# Patient Record
Sex: FEMALE | Race: WHITE
[De-identification: names, ages, dates, MRNs, and addresses within clinical notes are randomized per-mention and may not be internally consistent; named-entity substitution may affect disease eponyms.]

---

## 2018-09-21 ENCOUNTER — HOSPITAL ENCOUNTER (OUTPATIENT)
Dept: HOSPITAL 46 - ED | Age: 78
Setting detail: OBSERVATION
LOS: 3 days | Discharge: HOME | End: 2018-09-24
Attending: SPECIALIST | Admitting: SPECIALIST
Payer: MEDICARE

## 2018-09-21 VITALS — HEIGHT: 65 IN | WEIGHT: 192.99 LBS | BODY MASS INDEX: 32.15 KG/M2

## 2018-09-21 DIAGNOSIS — E78.00: ICD-10-CM

## 2018-09-21 DIAGNOSIS — M81.0: ICD-10-CM

## 2018-09-21 DIAGNOSIS — M17.11: Primary | ICD-10-CM

## 2018-09-21 DIAGNOSIS — K52.9: ICD-10-CM

## 2018-09-21 DIAGNOSIS — M54.9: ICD-10-CM

## 2018-09-21 DIAGNOSIS — I10: ICD-10-CM

## 2018-09-21 DIAGNOSIS — Z79.899: ICD-10-CM

## 2018-09-21 DIAGNOSIS — K21.9: ICD-10-CM

## 2018-09-21 DIAGNOSIS — F17.200: ICD-10-CM

## 2018-09-21 DIAGNOSIS — M25.861: ICD-10-CM

## 2018-09-21 DIAGNOSIS — G89.29: ICD-10-CM

## 2018-09-21 DIAGNOSIS — R26.2: ICD-10-CM

## 2018-09-21 PROCEDURE — G8979 MOBILITY GOAL STATUS: HCPCS

## 2018-09-21 PROCEDURE — G8978 MOBILITY CURRENT STATUS: HCPCS

## 2018-09-21 PROCEDURE — 0S9C3ZX DRAINAGE OF RIGHT KNEE JOINT, PERCUTANEOUS APPROACH, DIAGNOSTIC: ICD-10-PCS | Performed by: EMERGENCY MEDICINE

## 2018-09-21 PROCEDURE — G0378 HOSPITAL OBSERVATION PER HR: HCPCS

## 2018-09-21 NOTE — XMS
PreManage Notification: DARVIN QUIJANO MRN:[id number]
 
Security Information
 
Security Events
No recent Security Events currently on file
 
 
 
CRITERIA MET
------------
- POL
 
 
CARE PROVIDERS
There are no care providers on record at this time.
 
[facility] has no Care Guidelines for this patient.
 
CECILIA VISIT COUNT (12 MO.)
-------------------------------------------------------------------------------------
2 [facility]
-------------------------------------------------------------------------------------
TOTAL 2
-------------------------------------------------------------------------------------
NOTE: Visits indicate total known visits.
 
ED/UCC VISIT TRACKING (12 MO.)
-------------------------------------------------------------------------------------
09/21/2018 20:04
[facility]
 
TYPE: Emergency
 
COMPLAINT:
- R LEG PAIN/NO INJURY
-------------------------------------------------------------------------------------
03/15/2018 18:02
[facility]
 
TYPE: Emergency
 
COMPLAINT:
- FAINTING/ILL
 
DIAGNOSES:
- Other long term (current) drug therapy
- Nicotine dependence, unspecified, uncomplicated
- Influenza due to other identified influenza virus with other respiratory
manifestations
- Essential (primary) hypertension
- Syncope and collapse
- Dehydration
-------------------------------------------------------------------------------------
 
 
INPATIENT VISIT TRACKING (12 MO.)
No inpatient visits to display in this time frame
 
 
[email or web address]

## 2018-09-22 NOTE — NUR
PT UP TO BEDSIDE COMMODE FOR SOME GAS. PT ABLE TO PIVOT TO BEDSIDE COMMODE.
FAMILY IN ROOM. PT STATES HER KNEE IS FEELING MODERATELY BETTER.

## 2018-09-22 NOTE — NUR
0.5MG DILAUDED WAS GIVEN FOR PAIN 8/10. RIGHT PEDIS PULSE IS +1 BUT TOES ARE
WARM TO TOUCH. RIGHT KNEE HAS MINIMAL EDEMA PRESENT, NO REDNESS OR HEAT NOTED.
ALL LOBES ARE CLEAR, ABD SOUNDS ARE PRESENT. PT IS DUE TO VOID BY 0600. NO NEW
CONCERNS NOTED AT THIS TIME.

## 2018-09-22 NOTE — NUR
PT ARRIVED TO UNIT VIA STRETCHER. PT PULLED TO BED. TOLERATED WELL. PT C/O
SPASMS TO R KNEE WHILE WRTIER PERFORMING ADMISSION ASSESSMENT. PT REQUESTS
PRN. PT DENIES OTHER NEEDS AT THIS TIME. CALL LIGHT WITHIN REACH.

## 2018-09-22 NOTE — NUR
PT HAD A GOOD NAP THIS AFTERNOON AND STATES THAT HER KNEE IS FEELING SLIGHTLY
BETTER. WAS ABLE TO PIVOT TRANSFER TO THE BEDSIDE COMMODE FOR SOME GAS.
GABAPENTIN SEEMS TO BE HELPING AS SHE HAS NOT HAD ANY PAIN MEDICATION. ARVIN ALEJO DRAINING LIGHT YELLOW URINE. ENCOURAGE ORAL INTAKE. 1 LITER BOLUS GIVEN.

## 2018-09-22 NOTE — NUR
RECIEVED REPORT FROM LEONOR ACKERMAN. DR TEAGUE IN ROOM WITH PT. DISCUSSED PLAN OF
CARE, GETTING OUT OF BED, IMAGING, AND SAMANIEGO.  RN WILL PLACE SAMANIEGO WHEN PT
RETURNS TO THE FLOOR FROM IMAGING.

## 2018-09-22 NOTE — NUR
PT OFF FLOOR TO CT/XRAY.  ORDER FOR 2 VIEW CHEST X-RAY AND CT OF R KNEE.
SAMANIEGO WILL BE PLACED ONCE SHE RETURNS.  PT ABLE TO STAND PIVOT TO WHEELCHAIR.

## 2018-09-22 NOTE — NUR
PT ARRIVED ON FLOOR AT 0150. SBP WAS ELEVATED 162. ALL LOBES ARE CLEAR, ABD
SOUNDS PRESENT. RIGHT KNEE HAS MINIMAL EDMEA, PEDIS PULSES +2, NO REDNESS AND
NO HEAT NOTED. PAIN IS WELL CONTROLLED WITH PRN PAIN MEDS. PT AT TIMES HAS LEG
SPASMS AS WELL. PT SO FAR STILL HAS NOT VOIDED. WILL PUT PT ON BED PAN SINCE
SHE IS NOT ABLE TO PUT WEIGHT ON HER RIGHT LEG.

## 2018-09-22 NOTE — NUR
PT RESTING IN BED, WAITING FOR HER  AND DAUGHTER TO RETURN.  SHE HAS
LUNCH AT BEDSIDE, SHE WILL EAT WHEN THEY RETURN.  BOLUS OF FLUIDS IS RUNNING.
NO COMPLAINTS OF PAIN, NO NAUSEA, NO VOMITING.  SAMANIEGO DRAINING YELLOW URINE.

## 2018-09-22 NOTE — NUR
V/S ARE WDL, ALL LOBES ARE CLEAR, ABD SOUNDS ARE PRESENT. RIGHT KNEE HAS
SLIGHT EDEMA PRESENT BUT NO REDNESS OR HEAT WAS NOTED. SLIGHT EDEMA WAS ALSO
NOTED TO RIGHT ANKLE. PT DENIES PAIN OR MUSCLE SPASMS AT THIS TIME. NO NEW
CONCERNS NOTED SO FAR.

## 2018-09-22 NOTE — NUR
REPORT RECEIVED FROM LEONOR MANRIQUE. PT IN BED WITH FAMILY IN ROOM. TALKATIVE AND
PLEASANT. PT DENIES CONCERNS. STATES HER KNEE IS SWOLLEN AND IS STILL PAINFUL
BUT ACCEPTABLE.

## 2018-09-22 NOTE — NUR
RECEIVED REPORT AT 1900. FOUND PT IN BED AWAKE WITH  AT BEDSIDE. PT HAD
NO NEEDS OR CONCERNS AT THIS TIME.

## 2018-09-23 NOTE — NUR
RECIEVED BEDSIDE REPORT FROM LEONOR SUNG. PT AWAKE AND ALERT IN BED, SMILING
AND TALKING.  PT REPORTS FEELING "GOOD", REQUESTS 3RD CUP OF COFFEE.  PT
REPORTS NO PAIN IN RIGHT KNEE, NO REDNESS, NO WARMTH.  SAMANIEGO DRAINING COPIOUS
AMOUNTS OF URINE.

## 2018-09-23 NOTE — NUR
V/S ARE WDL, ALL LOBES CLEAR. RIGHT KNEE HAS LOOKED THE SAME SINCE ADMISSION.
PAIN IS 6/10. PRN 5MG OXY GIVEN. NO CHANGES AT ALL NOTED SO FAR.

## 2018-09-23 NOTE — NUR
PT HAD AN UNEVENTFUL NIGHT. RIGHT KNEE HAS HAD NO CHANGES IN SYMPTOMS AND
APPEARANCE SINCE HER ADMISSION. PT DID NOT NEED ANY PRN
PAIN MEDICATION FORM ME SO
FAR. V/S ARE WDL SO FAR. URINE OUTPUT IS ADEQUATE. NO NEW CONCERNS SO FAR.

## 2018-09-23 NOTE — NUR
PT REMAINS AFEBRILE WITH NO REDNESS, MINIMAL SWELLING, AND NO WARMTH ON RIGHT
KNEE. PT REPORTS PAIN WITH MOVEMENT, BUT PAIN IS TOLERABLE.  PT INITIATED
AMBULATION IN ROOM WITH FWW AND RN.  ONE PERSON ASSIST.  PT TOLERATING DIET
WELL.  URINE OUTPUT QS.  VSS.  PT EVAL COMPLETE. PT AMBULATED WITH PHYSICAL
THERAPY.

## 2018-09-23 NOTE — NUR
PT IN BED WITH FAMILY AT BEDSIDE. STATES SHE HAS NO PAIN IN HER KNEE UNLESS
SHE MOVES IT.  TOLERATED PHYSICAL THERAPY WELL.  NO NAUSEA, NO DISCOMFORT, PT
STATES SHE "FEELS PRETTY GOOD TODAY."

## 2018-09-23 NOTE — NUR
DISCUSSED TIMING OF CARE WITH KAYLYNN, PHYSICAL THERAPY.  SHE WILL BE IN TO DO
PT EVAL ABOUT 1000.  WE DISCUSSED OFFERING PAIN MEDICATION PRIOR TO EVAL. RN
ASKED PT IF SHE WOULD LIKE PAIN MEDICATIONS PRIOR TO PHYSICAL THERAPY. PT
DECLINED PAIN MEDS, STATED "I'LL BE STRONG".  RN EDUCATED PT ON TAKING
APPROPRIATE PAIN MEDICATIONS AND HEALING.  PT STILL DECLINED PAIN MEDS.  RN
ADVISED TO CALL IF SHE CHANGES HER MIND OR NEEDS PAIN MEDS AFTER PHYSICAL
THERAPY.

## 2018-09-23 NOTE — NUR
VITALS AND I&OS DONE AND CHARTED, FRESH ICE WATER GIVEN. BEDSIDE TABLE AND
CALL LIGHT IN REACH. PT NEEDS NOTHING AT THIS TIME.

## 2018-09-23 NOTE — NUR
PT CALLED RN TO ROOM TO ASSIST HER WALKING TO THE BATHROOM.  PT ATTEMPTED TO
HAVE A BM, NO RESULTS.  PT WALKED WITH RN AND FWW TO BATHROOM.  TOLERATED
WELL.  PT REPORTED PAIN WITH AMBULATION, BUT PAIN WAS TOLERABLE.  DENIED NEED
FOR PAIN MEDICATIONS.  DAUGHTER AT BEDSIDE.

## 2018-09-23 NOTE — NUR
RECEIVED REPORT AT 1900, FOUND PT IN BED WATCHING TV. PT DENIED PAIN AND HAD
NO OTHER CONCERNS AT THAT TIME.

## 2018-09-24 NOTE — NUR
PT UP WALKING IN HALLS WITH PHYSICAL THERAPY.  PT APPEARS TO BE TOLERATING
WELL, LAUGHING AND SMILING WITH STAFF.  RN ANSWERED QUESTIONS RE: GOUT POSED
BY THERAPY AND PT.

## 2018-09-24 NOTE — NUR
PT AT FIRST HAD A VERY UNEVENTFUL NIGHT. PRN OXY WAS GIVEN BEFORE BEDTIME DUE
TO PAIN. RIGHT KNEE HAS REMAIND UNCHANGED SINCE ADMISSION ON 9/22. PT SLEPT
MOST OF THE NIGHT. V/S ARE WDL.
AT ABOUT 0400 PT HAD PAIN 6/10 AND IV TORADOL WAS TO BE GIVEN. IV SITE WAS
LEAKING, PT REFUSED A NEW SITE AND ALSO REFUSED PRN OXY. MD TEAGUE WAS CALLED
AND AN ORDER FOR PO TORADOL WAS GIVEN. PT AT THIS TIME IS SLEEPING.
ONE NEW CONCERN AT THIS TIME IS THAT BASE OF HER LEFT THUMB IS SWOLLEN AND
VERY PAINFUL TO TOUCH. WILL CONTINUE TO MONITOR.

## 2018-09-24 NOTE — NUR
FOUND PT AWAKE IN BED COMPLAINING OF PAIN 6/10. PT STATED THAT PO PRN OXY DID
NOT HELP. WHILE GIVING IV PRN TORADOL IT WAS NOTED THAT HER IV SITE WAS
LEAKING. PT AT THIS TIME REFUSES A NEW IV SITE AND DOES NOT WANT THE PO PRN
OXY EITHER. MD TEAGUE TO BE CALLED FOR ANOTHER PO PRN PAIN MEDICATION.

## 2018-09-24 NOTE — NUR
PATIENT IN BED RESTING WATCHING TV. FRESH WATER GIVEN. CALL LIGHT IN REACH. NO
OTHER NEEDS AT THIS TIME.

## 2018-09-24 NOTE — NUR
PATIENT WAS IN BED, FACE WASHED, PATIENT NOW  LAYING IN BED. VITALS DONE ,
CALL LIGHT IN REACH, 1 PA WITH 4WW
THERPHY NOW WITH PATIENT WALKING IN [place], FRESH WATER
GIVEN. NO OTHER NEEDS AT THIS TIME.

## 2018-09-24 NOTE — NUR
Per patient request, she does not want the RX for oxycodone. Dr Escudero is
aware of this & replaced pain medication with ketorolac. Pharmacist cancelled
the order for oxycodone on discharge orders.

## 2018-09-24 NOTE — NUR
Verified with Dr Escudero that he intended for patient to receive #60 doses (not
capsules). Called [facility], talked to [facility], changed RX to Gabapentin
300mg with sig of: Take 2 capsules PO TID. Disp #120

## 2018-09-24 NOTE — NUR
RECIEVED BEDSIDE REPORT FROM LEONOR SUNG.  PT IS AWAKE AND ALERT IN BED.
PT FEELS SHE DID NOT SLEEP MOST OF THE NIGHT, REPORT FROM NIGHT RN IS THAT SHE
SLEPT WELL UNTIL 0400.  PT FEELS OXY WAS NOT EFFECTIVE FOR PAIN CONTROL, PO
TORADOL WAS EFFECTIVE.  PT WOULD LIKE DR TEAGUE TO EXPLAIN DISEASE PROCESS AND
WHY SHE IS NOT "BETTER" TO PT AND HER .  PT HAS NO IV ACCESS.

## 2020-01-10 NOTE — NUR
PT HAS BEEN UP TO BATHROOM WITH SBA USING FWW, REFUSED TO SIT UP IN CHAIR
TODAY, TEARFUL WHEN PHYSICAL THERAPY WORKED WITH HER, GOOD PAIN CONTROL USING
OXYCODONE AND POSITIONING OF R KNEE, ICE TO KNEE ON AND OFF TODAY, GOOD CMS TO
FOOT, ONEIDA HOSE ON, PICCO DRSG INTACT WITH GREEN LIGHT, ON-Q PATENT AND SECURE.
NO NEW SHADOWING NOTED ON DRSG. PT STATES SHE IS JUST EMOTIONAL AND MAY NEED
TO GO TO NURSING HOME. ENC PATIENT TO CONT. TO WORK WITH REHAB TO GET
STRONGER. POOR APPETITE TODAY. ALTERNATIVES OFFERED BUT MOSTLY REFUSED. USING
CALL LIGHT APPROPROIATELY.

## 2020-01-10 NOTE — NUR
IN TO TALK WITH PT, SHE STATED UNDERSTANDING OF WHY SHE CAME BACK INTO THE
HOSPITAL AND THEN STATES, "I NEED A DAY OFF FROM EVERYTHING WHERE I JUST DON'T
HAVE TO DO ANYTHING THAT ANYONE ASKS.  I JUST WANT TO BE LEFT ALONE."  STATES
SHE REMEMBERS THE INFORMATION THAT WE GAVE HER FROM HER KNEE SURGERY.  PT JUST
WANTS TO REST.

## 2020-01-10 NOTE — NUR
PT ARRIVED TO FLOOR AT 0645. COMPLETED ADMISSION HX WITH PT. SHE IS ORIENTED
TO THE ROOM AND DENIES FURTHER NEEDS AT THIS TIME.  IS AT BEDSIDE AND
CALL LIGHT IS CLOSE.

## 2020-01-10 NOTE — NUR
PT ASSESSMENT, VS AND I&O COMPLETED. PRN PAIN MEDICATION PROVIDED. PT UP TO
BSC WITH 1 PA, FWW AND BACK TO BED. IV CDI, WNL, FLUSHED WELL. ICE WATER
PROVIDED. KAREN DRY, INTACT, FLASHING GREEN. ON-Q AT 8. NO OTHER NEEDS. CALL
LIGHT IN REACH.

## 2020-01-10 NOTE — NUR
PT IN TO EVAL PATIENT, PREMEDICATED FOR PAIN, PATIENT IS EMOTIONAL THIS
AFTERNOON, STATES THERE IS NO SPECIFIC REASON, DENIES ANY NEEDS.

## 2020-01-10 NOTE — NUR
Discussed this patient with Dr. Paniagua.  Concerns pt has arthrities, shoulder
injury, and deconditioning.  Will evaluate patient.
 
In and spoke with patient and she feels she is really not able to go home.  Pt
is tearful.  Explained options of PT with HH or possible admit to a SNF for
further PT for strengthening.  Gave names of SNFs/HH in the area and she would
like to stay in [place].  Patient and  discussed options and feel pt
would do best at a SNF.  Let her know I will send her chart to [facility]. Pt states
she declined PT today as she did not feel up to it.  I spoke with OT and PT
and they both feel pt would do best with placement in a SNF and may need a
longer term placement for strengthening.
they
feel pt wou

## 2020-01-10 NOTE — NUR
VISITED BRIEFLY WITH PT AS SHE WAS BEING WHEELED TO HER RM FROM ER. PT WAS
DISCOURAGED, GAVE BLESSING, WILL FOLLOW AS NEEDED

## 2020-01-10 NOTE — NUR
PT STATES SHE IS COMFORTABLE AT THIS TIME, BREAKFAST ORDERED,  AT
BEDSIDE AND COFFEE OFFERED. COMPLETED ADMISSION ASSESSMENT, PT DENIES NEEDS AT
THIS TIME.

## 2020-01-10 NOTE — NUR
SBA INTO BATHROOM USING FWW TO VOID, MINIMAL ASSIST REQUIRED DENIED DIZZINESS,
ORTHOSTATIC V/S TAKEN. STATES SHE WANTS TO NAP BEFORE WORKING WITH PT. CALL
LIGHT IN EASY REACH.

## 2020-01-10 NOTE — NUR
Spoke with Ai, states she fell in the bathroom while using her walker.
States she did not want to return to the hospital, family called the
ambulance.  States she hit the back of her head.  Has ramp, walker, commode at
home.  Family will assist her. Would like to return to home on discharge.

## 2020-01-10 NOTE — NUR
PATIENT ARRIVED HERE ON MED/SURG AT 0645. PATIENT'S RIGHT KNEE IS STILL
BANDAGED FROM HER SURGERY LAST MONDAY. PER REPORT FROM LEONOR LUIS IN THE ER.
PATIENT HAD A SYNCOPAL EPISODE AT HOME AND HIT HER HEAD ON THE VANITY AS SHE
WENT DOWN.  CALLED AN AMBULANCE AND SHE WAS BROUGHT TO THE ER.
PATIENT'S ACEWRAP STILL REMAINS IN PLACE FROM WHEN SHE DISCHARGED FROM HERE
YESTERDAY. PATIENT STILL ON-Q AND KAREN IN PLACE. KAREN DRESSING COVERED IN
DRIED BLOOD ON THE OUTER RIGHT HALF. PATIENT HAS A HX OF RT TOTAL KNEE ON
MONDAY. PATIENT'S PAIN IS 7/10 AND HER LAST PAIN MEDS WERE TAKEN AT
HOME AT 1930. WAS TOLD IN REPORT PATIENT TAKES ASA 325MG BID. HEAD CT AND EKG
NEGATIVE PER REPORT. 500ML BOLUS OF FLUID AND ROCEPHIN GIVEN IN ER.

## 2020-01-10 NOTE — NUR
SHIFT REPORT RECIEVED FROM PRAVEENA GALVEZ. KAREN DRY AND INTACT. ON-Q @ 8, KAREN
BLINKING GREEN. MILD SWEELING TO INNER RIGHT KNEE. PAIN 7/10, ICE PACK
PROVIDED. NO OTHER NEEDS, CALL LIGHT IN REACH.

## 2020-01-11 NOTE — NUR
ASSESSMENT COMPLETED. SCHEDULED MEDS PROVIDED. PRN PAIN MED PROVIDED FOR 6/10
RIGHT KNEE PAIN. DRESSING DRY, INTACT. IV CDI, WNL, FLUSHED WELL. BLE 1+
EDEMA. SCATTERED BRUISING ON ARMS. BRUISE NOTED TO UPPER RIGHT BACK. EDEMA ON
INNER RIGHT KNEE, GENERALIZED. NO OTHER NEEDS AT THIS TIME, CALL LIGHT IN
REACH.

## 2020-01-11 NOTE — NUR
DR TEAGUE IN TO SEE PT, REMOVES DRESSING TO KNEE APPLIES A ZIP CLOSURE DEVICE
INSTRUCTS STAFF TO APPLY ALLEVYN OVER THIS. PT IS RESTING IN BED CRYO IN
PLACE. AGREES SHE IS COMFORTABLE.

## 2020-01-11 NOTE — NUR
PT CALLS TO USE BR AND BACK TO BED. PAIN 6/10 IN RIGHT KNEE. PRN PAIN MED
PROVIDED. ORTHOSTATIC VS COMPLETED. ICE PACK AND ICE WATER PROVIDED. NO OTHER
NEEDS. CALL LIGHT IN REACH.

## 2020-01-11 NOTE — NUR
SHIFT REPORT RECIEVED FROM MARCOS GALVEZ. R KNEE SWELLING TO INNER KNEE,
GENERALIZED. DRESSING DRY AND INTACT, SMALL AREA OF DRIED BLOOD. IV CDI. PAIN
5/10, PT DENIES NEED FOR PAIN INTERVENTION. CRYO CUFF, ONEIDA HOSE IN PLACE. TELE
#1 SHOWS SR @ 70. NO NEEDS AT THIS TIME. CALL LIGHT IN REACH.

## 2020-01-11 NOTE — NUR
PT HAS SLEPT MOST THE SHIFT. PAIN IN RIGHT KNEE MANAGED WITH PRN PAIN
MEDICATION. GENERALIZED SWELLING NOTED TO INNER LEFT KNEE. EDEMA TO BLE,
1+, ONEIDA HOSE ON. ICE PACKS PROVIDED FOR KNEE. KAREN HAS DRIED BLOOD OVER MOST
THE DRESSING, BLINKING GREEN. ON-Q SET AT 8. ORTHOSTATIC VITALS TAKEN,
NEGATIVE. PT AMBULATES WELL WITH 1PA, FWW. PT HAS A LOWER BP IN THE LEFT
ARM THAN THE RIGHT ARM. TELE # 1 SR TRENDING IN THE 60S. IV CDI, WNL, FLUSHED
WELL.

## 2020-01-11 NOTE — NUR
CALL LIGHT ANSWERED. PATIENT RESTING IN BED. FAMILY IN ROOM. PATIENT GOES TO
USE THE BATHROOM. PATIENT USES WALKER. ONE PERSON ASSISTING. PATIENT BACKS TO
BED. CALL LIGHT WITHIN REACH. NO OTHER NEEDS AT THIS TIME

## 2020-01-11 NOTE — NUR
PT STATES PAIN IS 6/10 IN RIGHT KNEE. PRN PAIN MED PROVIDED. ASSESSMENT, VS
AND I&O COMPLETED. PT PROVIDED ITEMS TO BRUSH TEETH BY AYDE GALVEZ. ICE PACK AND
ICE WATER PROVED. NO OTHER NEEDS. CALL LIGHT IN REACH.

## 2020-01-11 NOTE — NUR
PATIENT RESTING IN BED. VITAL SIGNS AND I&O DONE. LOW SYSTOLIC BLOOD PRESSURE.
RN NOTIFIED. CRYO MACHINE FILLED. CALL LIGHT WITHIN REACH. NO OTHER NEEDS AT
THIS TIME

## 2020-01-11 NOTE — NUR
MARLENE'NIKKO TELE, RN SUPERVISOR CALLED SAYING DR WALKER SAID TO MARLENE TELE. VS AND I&O'S
ENTERED. PT DENIES NEEDS FURTHER NEEDS AT THIS TIME. CALL LIGHT IS WITHIN
REACH.

## 2020-01-11 NOTE — NUR
PATIENT RESTING IN BED.  AND RN IN ROOM. VITAL SIGNS AND I&O DONE. CALL
LIGHT WITHIN REACH. NO OTHER NEEDS AT THIS TIME

## 2020-01-12 NOTE — NUR
IN TO ANSWER CALL LIGHT. PT ASSISTED UP TO RESTROOM WITH SBA AND FWW. PT VOIDS
Q.S. CLEAR YELLOW URINE AND ASSISTED BACK TO BED. CALL LIGHT AND H2O IN REACH.
PT ASSISTED INTO POSITION OF COMFORT AND FRESH ICE WATER PROVIDED. NO OTHER
NEEDS OR CONCERNS VOICED. Electrodesiccation And Curettage Text: The wound bed was treated with electrodesiccation and curettage after the biopsy was performed.

## 2020-01-12 NOTE — NUR
SHIFT REPORT RECEIVED FROM DAYSHIFT LEONOR HICKMAN AT BEDSIDE. PT AWAKE AND RESTING
IN BED, CYRO CUFF REMOVED PER PT REQUEST, DRESSING INTACT. NO FURTHER NEEDS,
CALL LIGHT IN REACH.

## 2020-01-12 NOTE — NUR
PT RESTING IN BED ALERT AND ORIENTED WATCHING TV. BEDSIDE REPORT RECEIVED FROM
LEONOR AGUSTIN. PT DENIES NEEDS OR CONCERNS. CALL LIGHT AND H2O IN REACH. DSG CDI
WITH SLIGHT SHADOWING TO RIGHT KNEE WITH SOME SWELLING NOTED. PT STATES SHE IS
COMFORTABLE,  NO NEEDS OR CONCERNS VOICED. CRYO CUFF AND ONEIDA HOSE IN PLACE AND
PT TOLERATING WELL.

## 2020-01-12 NOTE — NUR
MANUAL BP TAKEN BY THIS RN ON RIGHT ARM. BP WITHIN FLOOR PARAMTERS, WILL
MONITOR. NO NEEDS, CALL LIGHT IN REACH.

## 2020-01-12 NOTE — NUR
PT CALLS TO USE BR. PT TOLERATES FWW WELL. UP TO BR AND BACK TO BED. PAIN IN
RIGHT KNEE 6/10. PRN PAIN MED PROVIDED. ASSESSMENT AND VS COMPLETED. CRYO CUFF
REFILLED WITH ICE WATER. ICE WATER PROVIDED. NO CHANGES TO EDEMA OT BANDAGING.
NO OTHER NEEDS, CALL LIGHTS IN REACH.

## 2020-01-12 NOTE — NUR
Pt assisted up to restroom with sba, pt back to bed. Call light and h2o in
reach and provided pt with warm blanket. no furhter needs or concerns voiced.
Cryo cuff in place.

## 2020-01-12 NOTE — NUR
ASSESSMENT COMPLETE, SCHEDULED MEDS GIVEN (SEE EMAR). PT A/O, RATES PAIN 6/10,
SCHEDULED AND PRN PAIN MEDICATION GIVEN. VSS. CYRO CUFF PLACED TO RIGHT KNEE
PER PT REQUEST, DRESSING TO RIGHT KNEE INTACT. SCANT YELLOW SHADOWING NOTED.
1-2+ EDEMA NOTED, SKIN SLIGHTLY WARMER COMPARED TO LEFT KNEE. PT DENIES
ADDITIONAL NEEDS, SINTA CNA IN ROOM TO REFILL CYRO CUFF MACHINE. CALL LIGHT IN
REACH.

## 2020-01-12 NOTE — NUR
PT RESTING IN BED WATCHING FOOTBALL. PT DENIES NEEDS OR CONCERNS. CALL LIGHT
AND H2O IN REACH. CRYO CUFF IN PLACE.

## 2020-01-12 NOTE — NUR
ultrasound tech currently in room completing ultrasound. Call light and h2o in
reach. No needs or concerns voiced.

## 2020-01-12 NOTE — NUR
PT RESTING SUPINE IN BED ALERT AND ORIENTED. CALL LIGHT AND H2O IN REACH. PT
ASSESSMENT COMPLETED. PT REPORTS PAIN OF 6/10 TO RIGHT KNEE. PRN PO ANALGESICS
AND AM MEDS ADMINSITERED -SEE EMAR.

## 2020-01-12 NOTE — NUR
PT HAS SLEPT MOST THE SHIFT. PAIN MANAGED WITH PRN MEDS. DRESSING DRY, INTACT,
2 SMALL AREAS OF DRY BLOOD. GENERALIZED EDEMA TO INNER RIGHT KNEE AND 1+ EDEMA
BLE. IV CDI, WNL, FLUSHED WELL. PT TOLERATED FWW, SBA WELL. UO SUFFICIENT. VSS
WITH BP TAKEN ON RIGHT ARM. PT TOLERATED CRYO CUFF WELL.

## 2020-01-12 NOTE — NUR
PT UP TO USE BR AND BACK TO BED, FWW, SBA. VS AND I&O COMPLETED. CRYO CUFF ON,
ONEIDA HOSE ON. NO OTHER NEEDS. CALL LIGHT IN REACH.

## 2020-01-12 NOTE — NUR
SBA TO THE BATHROOM USING WALKER. PATIENT IS BACK IN BED. PATIENT ASKED THE
CRYO CUFF OFF. WARM BLANKET PEOVIDED. CALL LIGHT IN REACH. ROOM LIGHTS OFF.

## 2020-01-12 NOTE — NUR
ROUNDED AS CHARGE. PATIENT ASSISTED TO THE RESTROOM AS A SBA W/FWW. PATIENT
WAS ABLE TO VOID. PATIENT IS BACK IN BED RESTING. PATIENT DENIES ANY FURTHER
NEEDS. CALL LIGHT IN REACH. PATIENT IS REFUSING TO WEAR CRYO AT THIS TIME.
EDUCATED PATIENT ON IMPORTANCE OF CRYO. PATIENT CONTINUES TO REFUSE.

## 2020-01-13 NOTE — NUR
In and spoke with Ai.  She continues to want to go to a SNF.  Reminded I
had sent her chart last week and had received tentative agreement from [facility].
She states she is having and ECHO in the next hour. Confirmed this will be in
the next hour.  Left message for [facility] asking is they can still take today,
transport, and time?

## 2020-01-13 NOTE — NUR
NOTED PT BP ELEVATED, ALL OTHER VS STABLE AND WITHIN PT'S NORMAL RANGE. PT
DENIES HEADACHE, DIZZINESS, CHEST DISCOMFORT, VISION CHANGES, OR OTHER
CONCERNS. NOTIFIED DR. WALKER. RECIEVED ORDER FOR LISINOPRIL, SEE EMAR. PT
MEDICATED. CRYO CUFF IN PLACE TO RIGHT KNEE. RIGHT KNEE INCISION DRESSED WITH
ALLEVYNS, SMALL AMOUNT OF BROWNISH DRAINAGE NOTED. MODERATE AMOUNT OF EDEMA
NOTED OF RIGHT LE, BRUISING AROUND RIGHT KNEE WITH REDNESS MARKED WITH
SURGICAL PEN. PT ALERT AND ORIENTED. DENIES PAIN. CALL LIGHT WITHIN REACH.

## 2020-01-13 NOTE — NUR
PATIENT RESTING IN BED.  IN ROOM. VITAL SIGNS AND I&O DONE. CALL LIGHT
WITHIN REACH. NO OTHER NEEDS AT THIS TIME

## 2020-01-13 NOTE — NUR
CHARGE NURSE REPORT RECEIVED FROM DAYSHIFT.  PT IN BED, AWAKE AND WATCHING TV,
NO NEEDS AT THIS TIME.

## 2020-01-13 NOTE — NUR
CALL LIGHT ANSWERED. PATIENT RESTING IN BED.  IN ROOM. PATIENT GOES TO
USE THE BATHROOM. HANDS WASHED. PATIENT BACKS TO BED. CRYO MACHINE FILLED. ICE
WATER GIVEN. CALL LIGHT WITHIN REACH. NO OTHER NEEDS AT THIS TIME

## 2020-01-13 NOTE — NUR
DR. WALKER ROUNDING ON PT. OBTAINED MANUAL BP. ALLEVYN DRESSING REMOVED FROM
RIGHT KNEE BY DR. WALKER TO ASSESS INCISION SITE. NEW ALLEVYN DRESSINGS APPLIED
AFTER ASSESSMENT. CRYO CUFF APPLIED TO RIGHT KNEE. PT  AT BEDSIDE. CALL
LIGHT WITHIN REACH.

## 2020-01-13 NOTE — NUR
PT LYING IN BED WATCHING TV. REPORTS SHE WASN'T HUNGRY AND DIDN'T WANT TO EAT
DINNER. DENIES PAIN OR OTHER CONCERNS AT THIS TIME. REQUESTED TO HAVE CRYO
CUFF REMOVED AS IT WAS "FREEZING MY LEG OFF". REMOVED AT THIS TIME. CALL LIGHT
WITHIN REACH.

## 2020-01-13 NOTE — NUR
INFOMRED BY MARY ANN BROWN OF ELEVATED BP, MANUAL BP TAKEN BY THIS RN. RESULT OF
184/66, APICAL HR OF 68. DR WALKER MADE AWARE, TELEPHONE ORDERS READ BACK TO
GIVE SCHEDULED AM METOPROLOL AND LISINOPRIL AT THIS TIME (SEE EMAR). DR WALKER
ALSO MADE AWARE OF REDDENED SQUARE AREA
NOTED BEHIND PT'S RIGHT KNEE. CHARGE RN ALSO MADE AWARE. OUTLINED WITH
SKIN MARKER, NO NEW ORDERS FROM MD. WILL MONITOR. PT REPORTS CONCERN R/T
POSSIBLE RETURNED
[facility]. MESSAGE SENT TO DR WALKER. NO FURTHER NEEDS, CALL LIGHT IN REACH.

## 2020-01-13 NOTE — NUR
PM ASSESSMENT COMPLETE. PM MEDS GIVEN WITHOUT ISSUE. PT DENIES PAIN AT THIST
TIME. FOAM DRESSING ON RIGHT KNEE INTACT WITH SMALL AMOUNT OF DRAINAGE.
BRUISING AND REDNESS NOTED ON RIGHT KNEE, REDNESS REMAINS UNCHANGED. PT DENIES
DIZZINESS AT THIS TIME. PRN GIVEN FOR SLEEP. PT IS REFUSING TO WEAR CRYO CUFF.
CALL LIGHT WITHIN REACH.

## 2020-01-13 NOTE — NUR
VISITED WITH PT AS SHE WAS SITTING UP IN BED. SHE IMMEDIATELY BEGAN TO LET ME
KNOW SHE WAS STRUGGLING WITH WHAT WAS HAPPENING IN HER LIFE. SHE WAS NOT ABLE
TO GET MUCH SLEEP, AND WAS UNSURE WHAT WAS HAPPENING NEXT IN HER CARE. SHE DID
SAY THAT THE RN'S HAD BEEN GOOD TO HER. HAD PRAYER WITH PT AND INFORMED HER I
WOULD CHECK WITH STAFF TO SEE IF THEY HAVE ANY NEWS TO GIVE TO HER. SHE
THANKED ME AND WILL FOLLOW AS NEESTIVEN

## 2020-01-13 NOTE — NUR
CALL LIGHT ANSWERED. PATIENT RESTING IN BED. PATIENT GOES TO USE THE BATHROOM.
PATIENT USES WALKER. ONE PERSON ASSISTING. PATIENT BACKS TO BED. CALL LIGHT
WITHIN REACH. NO OTHER NEEDS AT THIS TIME

## 2020-01-13 NOTE — NUR
PATIENT SITTING UP IN BED. PATIENT'S BREAKFAST ORDERED. CALL LIGHT WITHIN
REACH. NO OTHER NEEDS AT THIS TIME

## 2020-01-13 NOTE — NUR
ASSESSMENT COMPLETE, NO NEW CONCERNS. PT A/O, DENIES PAIN. RECENTLY RETURNED
FROM BATHROOM WITH HELP FROM MRAY ANN BROWN. DRESSING TO RIGHT KNEE INTACT, NO NEW
SHADOWING OR INCREASED EDEMA NOTED. CYRO CUFF IN PLACE. BILATERAL PEDAL PULSES
NOTED. PT REPORTS BLOOD WHEN WIPING, REDDNESS FROM SCANT RAW
AREA NOTED IN BETWEEN BUTTOCKS. BARRIER CREME ALREADY IN PLACE. NO FURTHER
NEEDS VERBALIZED AT THIS TIME, CALL LIGHT IN REACH.

## 2020-01-13 NOTE — NUR
CALL LIGHT ANSWERED. PT UP TO BR WITH FWW AND SBA, GAIT STEADY, DENIES
DIZZINESS. BACK TO BED, NOEL WELL. CALL LIGHT IN REACH.

## 2020-01-13 NOTE — NUR
PT A/O, PAIN CONTROLLED WITH PRN PAIN MEDICATION. VSS, PT USES CALL LIGHT
APPROPERIATELY. REGULAR DIET, TOLERATING WELL. NO NAUSEA. SBA WITH FWW,
VOIDING QS. NO BM. DRESSING TO RIGHT KNEE INTACT, SCANT SHADOWING. CYRO CUFF.

## 2020-01-13 NOTE — NUR
PT ATE ALL OF LUNCH, NOEL WELL. SBA WITH WALKER TO RESTROOM, BACK TO BED. CRYO
CUFF TO RIGHT KNEE. NO NEW DRAINAGE TO NEW DRESSING. PT DENIES PAIN OR OTHER
CONCER AT THIS TIME.  AT BEDSIDE. CALL LIGHT WITHIN REACH.

## 2020-01-13 NOTE — NUR
CALL LIGHT ANSWERED. PATIENT RESTING IN BED. PATIENT GOES TO USE THE BATHROOM.
LINENS CHANGED. PATIENT BACKS TO BED. PATIENT USES WALKER. ONE PERSON
ASSISTING. VITAL SIGNS AND I&O DONE. CRYO MACHINE FILLED. CALL LIGHT WITHIN
REACH. ICE WATER GIVEN. NO OTHER NEEDS AT THIS TIME

## 2020-01-13 NOTE — NUR
PT CALLED FOR ASSISTANCE TO RESTROOM. AMB TO BATHROOM WITH MINIMAL SBA. HAD
SMALL AMOUNT OF BROWN LIQUID BM WITH SCANT AMOUNT OF CHRISTIANNE BLOOD NOTED, SMALL
BIT OF CHRISTIANNE BLOOD NOTED WITH WIPING AS WELL. PT REPORTS SIGNIFICANT
TENDERNESS AND "BURNING" OF RECTUM WITH WIPING. BARRIER CREAM APPLIED TO
RECTUM AFTER CLEANING UP WITH WET WIPES. PT AMB BACK TO BED. SITTING UP AT
EDGE OF BED NOW. DENIES PAIN OR OTHER CONCERN. CALL LIGHT WITHIN REACH.

## 2020-01-13 NOTE — NUR
Notified by Dr. cruz to SNF is now on hold as he feels her surgical wound
maybe infected.  [facility] notified.

## 2020-01-13 NOTE — NUR
REPORT RECEIVED FROM DAY SHIFT RN. PT LYING IN BED, ALERT AND ORIENTED
WATCHING TV. PT DENIES FURTHER NEED AT THIS TIME. CALL LIGHT IN REACH.

## 2020-01-13 NOTE — NUR
PT RESTING IN BED, EYES CLOSED. RR EVEN AND UNLABORED. NO DISTRESS NOTED AT
THIS TIME, PT APPEARS COMFORTABLE. CALL LIGHT IN REACH.

## 2020-01-13 NOTE — NUR
PT ASSISTED TO RESTROOM WITH SBA AND WALKER, AMB BACK TO BED. ECHO TECH
PRESENT TO PERFORM ECHO. CALL LIGHT WITHIN REACH.

## 2020-01-14 NOTE — NUR
PATIENT RESTING IN BED. DAUGHTER IN ROOM. PATIENT GOES TO USE THE BATHROOM.
PATIENT USES WALKER. ORAL CARE DONE. HANDS AND FACE WASHED. PATIENT BACKS TO
BED. VITAL SIGNS AND I&O DONE. CALL LIGHT WITHIN REACH. NO OTHER NEEDS AT THIS
TIME

## 2020-01-14 NOTE — NUR
PT UP TO BR WITH SBA AND FWW, GAIT STEADY. DENIES DIZZINESS. BACK TO BED, NOEL
WELL. FRESH ICE TO CRYO. PT DENIES PAIN.

## 2020-01-14 NOTE — NUR
PT WORKING WITH PHYSICAL THERAPIST AT THIS TIME. DAUGHTER AT BEDSIDE. DENIES
DIZZINESS. PATIENT FOUND SITTING AT EDGE OF BED. DID NOT EAT MUCH BREAKFAST
D/T NOT LIKING FOOD PROVIDED. OFFERED MORE, BUT DENIED FOR NOW. GRAPE JUICE
MIXED WITH CHOLESTYRAMINE POWDER. RIGHT KNEE ALLEVYN IN PLACE ON RIGHT KNEE.

## 2020-01-14 NOTE — NUR
PT UP TO BATHROOM TO VOID, ONE PERSON WITH FWW. PT NOTED TO HAVE LIGHT CHRISTIANNE
RED TRACE BLOOD IN DEPENDS PT REPORTS " THAT IS FROM MY SORE BUTT CRACK" NOTED
TO POSSIBLY HAVE HEMORRHOID ALSO. BARRIER CREAM APPLIED.

## 2020-01-14 NOTE — NUR
PT UP AMBULATING IN [place] WITH GEOFF AND HER DAUGHTER ERIN. PT FRIENDLY, SEEMS
TO BE WORKING TO IMPROVE MOBILITY. PT IS SCHEDULED TO GO TO [facility] LATER TODAY.
EXTENDED A BLESSING, WILL FOLLOW AS NEEDED

## 2020-01-14 NOTE — NUR
Contacted [facility].  They will accept pt and transport today.  Will transport
some time around 10:30.  Updated Dr. Espinosa and he cannot complete dc in that
time as he is seeing pts in the unit.  Called [facility], and they will pick her up
afternoon.  Pt and staff updated.

## 2020-01-14 NOTE — NUR
PT RESTED WELL. A&O, USES CALL LIGHT. SBA WITH FWW TO BR. PT C/O DIZZINESS X
2. RIGHT KNEE BRUISED AND SWOLLEN, REDNESS THAT WAS OUTLINED ON PREVIOUS SHIFT
UNCHANGED. DRESSING CDI. DENIES PAIN. ORAL ABX.

## 2020-01-14 NOTE — NUR
CALL LIGHT ANSWERED. PT C/O DRY MOUTH, ICE CHIPS AND HARD CANDY GIVEN. FRESH
ICE TO CRYO CUFF, PT AGREEING TO WEAR IT AT THIS TIME.

## 2020-01-18 NOTE — OR
[facility]
                                    [address], [place]  [address]
_________________________________________________________________________________________
                                                                 Signed   
 
 
DATE OF OPERATION:
01/17/2020
 
SURGEON:
Livan Escudero MD
 
PREOPERATIVE DIAGNOSIS:
Right wound dehiscence.
 
PREOPERATIVE DIAGNOSES:
1. Right wound dehiscence.
2. Patellar tendon rupture.
 
PROCEDURE PERFORMED:
Irrigation and debridement right total knee with exchange of poly.
 
ASSISTANT:
None.
 
ANESTHESIA:
General.
 
BLOOD LOSS:
Minimal.
 
IMPLANTS:
4 x 11 polyethylene.
 
BRIEF HISTORY:
Darvin is a 79-year-old female who underwent total knee arthroplasty about 10 days ago.
She initially did well, however, she was at the nursing home when she states that she
fell.  The nursing home stated that she did not, but was lowered to the ground by the
CNA.  On initial presentation to the ER, her wound was clean and dry with no troubles.
She subsequently elected to leave the nursing home AMA and go home.  She was taken to
her vehicle in the parking lot of the ER, where she suffered a ground level fall,
opening the wound slightly at the inferior aspect.  This was pretty minimally open.
However, on subsequent examination by my PA the next day, it did show a broken suture in
the wound and about a 1 cm dehiscence.  It was then elected to bring her into the
hospital next morning for I and D.  Due to weather circumstances including heavy snow
and ice and her inability to travel, she was transported by ambulance.  Risks, benefits,
and alternatives of washing out the wound were discussed with her.  She elected to
proceed. 
 
    Electronically Signed By: LIVAN ESCUDERO MD  01/18/20 0718
_________________________________________________________________________________________
PATIENT NAME:     DARVIN QUIJANO                       
MEDICAL RECORD #: [id number]            OPERATIVE REPORT              
          ACCT #: [id number]  
DATE OF BIRTH:   [birth year]            REPORT #: [id number]      
PHYSICIAN:        LIVAN ESCUDERO MD              
PCP:              CHRISTINE CHAPIN MD    
REPORT IS CONFIDENTIAL AND NOT TO BE RELEASED WITHOUT AUTHORIZATION
 
 
                                  [facility]
                                    [address], [place]  [address]
_________________________________________________________________________________________
                                                                 Signed   
 
 
 
DESCRIPTION OF PROCEDURE:
Once consent was obtained, she was taken to the operating room.  After adequate
anesthesia, she was placed on operating room table.  All downside pressure points were
well padded and a hip bump was placed.  The old dressing was removed.  The zip line was
actually intact.  The wound was open about a centimeter over about a 4-5 cm length with
just some fat in the wound and a couple broken sutures.  We removed all the zip line and
dressing and prepped and draped the knee in standard sterile fashion using Hibiclens.
Once this was accomplished, I removed the sutures and palpated the wound.  It was
immediately obvious that on the medial side, she had dehisced the deep repair of the
MCL.  We then elected to open the wound over its entire length for I and D and poly
exchange.  The wound was opened as was the arthrotomy.  The arthrotomy was good, was
intact all the way down to the bottom of the patella.  It was only dehisced from the
bottom of the patella to the tibial insertion.  The arthrotomy was opened up all the way
and all leftover suture products were removed.  It was then immediately obvious that the
patellar tendon had ruptured in its distal 3rd.  There was still about 1.5 cm attached
to the tibia and about 2-2.5 cm to the patella.  We removed all devitalized tissue
sharply, washed out the knee with 2 L of dilute iodine solution after removing the
polyethylene.  We then scrubbed all metal and plastic surfaces using hydrogen peroxide
soaked sponge getting into all the crevices as far posterior as we could.  Once this was
accomplished, then the knee was washed out with further 2 L of normal saline under pulse
lavage.  The new polyethylene was then positioned to maintain the space.  It was elected
at this point not to repair the patellar tendon due to the contamination from the wound
dehiscence, although I think that is minimal given that it was only open 1 cm.  We then
elected to bring her back on Monday for patellar tendon repair and repeat washout.  We
then closed the arthrotomy using #1 PDS.  The patellar tendon was tacked back into
position using the #1 PDS.  The subcutaneous tissue was closed using #1 Stratafix, the
skin with staples.  She was then dressed with Allevyn dressing and a bulky Durham
compression dressing.  She was awakened and taken to the recovery room in satisfactory
condition.  All sponge, needle, and instrument counts were correct. 
 
 
 
            ________________________________________
            Livan Escudero MD 
 
 
BA/MODL
Job #:  [id number]
DD:  01/17/2020 09:46:59
DT:  01/17/2020 15:40:55
 
 
    Electronically Signed By: LIVAN ESCUDERO MD  01/18/20 0718
_________________________________________________________________________________________
PATIENT NAME:     DARVIN QUIJANO                       
MEDICAL RECORD #: [id number]            OPERATIVE REPORT              
          ACCT #: [id number]  
DATE OF BIRTH:   [birth year]            REPORT #: [id number]      
PHYSICIAN:        LIVAN ESCUEDRO MD              
PCP:              CHRISTINE CHAPIN MD    
REPORT IS CONFIDENTIAL AND NOT TO BE RELEASED WITHOUT AUTHORIZATION
 
 
                                  [facility]
                                    [address] [place] [address]  [address]
_________________________________________________________________________________________
                                                                 Signed   
 
 
Copies:                                
~
 
 
 
 
 
 
 
 
 
 
 
 
 
 
 
 
 
 
 
 
 
 
 
 
 
 
 
 
 
 
 
 
 
 
 
 
 
 
 
 
 
    Electronically Signed By: LIVAN ESCUDERO MD  01/18/20 0718
_________________________________________________________________________________________
PATIENT NAME:     DARVIN QUIJANO                       
MEDICAL RECORD #: [id number]            OPERATIVE REPORT              
          ACCT #: [id number]  
DATE OF BIRTH:   [birth year]            REPORT #: [id number]      
PHYSICIAN:        LIVAN ESCUDERO MD              
PCP:              CHRISTINE CHAPIN MD    
REPORT IS CONFIDENTIAL AND NOT TO BE RELEASED WITHOUT AUTHORIZATION

## 2020-05-22 NOTE — EKG
[facility]
                                    [address] [facility]
                                  [place], [place]  [address]
_________________________________________________________________________________________
                                                                 Signed   
 
 
Normal sinus rhythm
Nonspecific ST and T wave abnormality
Abnormal ECG
When compared with ECG of 10-HUGH-2020 01:36,
No significant change was found
Confirmed by ALINE HARRY MD ([id number]) on 5/22/2020 6:41:40 AM
 
 
 
 
 
 
 
 
 
 
 
 
 
 
 
 
 
 
 
 
 
 
 
 
 
 
 
 
 
 
 
 
 
 
 
 
 
 
 
    Electronically Signed By: ALINE HARRY MD  05/22/20 0641
_________________________________________________________________________________________
PATIENT NAME:     DARVIN QUIJANO                       
MEDICAL RECORD #: [id number]                     Electrocardiogram             
          ACCT #: [id number]  
DATE OF BIRTH:   [birth year]                                       
PHYSICIAN:   ALINE HARRY MD                   REPORT #: [id number]
REPORT IS CONFIDENTIAL AND NOT TO BE RELEASED WITHOUT AUTHORIZATION

## 2020-06-15 NOTE — NUR
1420) PATIENT WALKED DOWN [place] WITH WALKER, GATE BELT AND RN ELFEGO ASSIST.
PATIENT HAD NO PROBLEM WALKING.

## 2020-06-15 NOTE — OR
[facility]
                                    [address] [place], [place]  [address]
_________________________________________________________________________________________
                                                                 Signed   
 
 
DATE OF OPERATION:
06/15/2020
 
SURGEON:
Livan Escudero MD
 
PREOPERATIVE DIAGNOSIS:
Open wound dehiscence, left knee, status post patellar tendon reconstruction.
 
POSTOPERATIVE DIAGNOSIS:
Open wound dehiscence, left knee, status post patellar tendon reconstruction.
 
PROCEDURE PERFORMED:
Excision of wound margins and deep subcu tissue.
 
ASSISTANT:
HELEN Garcia.
 
ANESTHESIA:
General.
 
BLOOD LOSS:
50 mL.
 
TOURNIQUET TIME:
Zero.
 
BRIEF HISTORY:
Darvin is a 80-year-old female, who underwent a total knee arthroplasty in January.  She
subsequently developed several falls eventually opening her wound or rupturing her
patellar tendon.  She was washed out and then referred to [place] where she underwent a
patellar reconstruction and healed uneventfully except for small portion of her wound in
the inferior 3rd.  This eventually opened up last week and showed no evidence of
healing.  We discussed sending her back to [place], which she refused.  I elected to
bring her to the operating room today for excision of that area.  Risks and benefits of
operative treatment were discussed with her and she elected to proceed. 
 
DESCRIPTION OF PROCEDURE:
Once consent was obtained, she was taken to the operating room.  After adequate
anesthesia, she was placed on operating table, all downside pressure points were well
padded.  The leg was prepped and draped in a standard sterile fashion.  The underlying
hole was then marked out with __________ this was then incised longitudinally.  The
 
    Electronically Signed By: LIVAN ESCUDERO MD  06/15/20 1551
_________________________________________________________________________________________
PATIENT NAME:     DARVIN QUIJANO                       
MEDICAL RECORD #: [id number]            OPERATIVE REPORT              
          ACCT #: [id number]  
DATE OF BIRTH:   [birth year]            REPORT #: [id number]      
PHYSICIAN:        LIVAN ESCUDERO MD              
PCP:              CHRISTINE CHAPIN MD    
REPORT IS CONFIDENTIAL AND NOT TO BE RELEASED WITHOUT AUTHORIZATION
 
 
                                  [facility]
                                    [address] [place] [address]  [address]
_________________________________________________________________________________________
                                                                 Signed   
 
 
depth of the actual hole was just into the subcu tissue __________ to the graft.  We
were able to get all the way around it and excised its base with good clean tissue.
This area was about a cm and half lateral to the incision.  We elected to leave that
soft tissue bridge because it had excellent vascularity.  We did not undermine that
side, but did undermine the skin laterally to mobilize it to closed the wound, it was
closed with absolutely no tension.  The sutures were placed medial to the other incision
to allow tension to be transferred to that side.  The wound was copiously irrigated with
antibiotic solution and then closed with 2-0 Monocryl and 2-0 nylon.  The skin margins
were flushed and pink at the end of the procedure.  The wounds were then dressed with a
KAREN wound VAC dressing.  She was placed back into a brace.  She tolerated this well.
All sponge, needle, and instrument counts were correct. 
 
 
 
            ________________________________________
            MD NELIDA Zelaya/JANENE
Job #:  [id number]
DD:  06/15/2020 11:57:26
DT:  06/15/2020 12:23:45
 
 
Copies:                                
~
 
 
 
 
 
 
 
 
 
 
 
 
 
 
 
 
 
    Electronically Signed By: LIVAN ESCUDERO MD  06/15/20 1551
_________________________________________________________________________________________
PATIENT NAME:     DARVIN QUIJANO                       
MEDICAL RECORD #: [id number]            OPERATIVE REPORT              
          ACCT #: [id number]  
DATE OF BIRTH:   [birth year]            REPORT #: [id number]      
PHYSICIAN:        LIVAN ESCUDERO MD              
PCP:              CHRISTINE CHAPIN MD    
REPORT IS CONFIDENTIAL AND NOT TO BE RELEASED WITHOUT AUTHORIZATION

## 2020-06-15 NOTE — NUR
06/15/20 1215 Alexa Gibbons 1155 PT ARRIVED IN PACU NON RESPONSIVE TO NOXIOUS STIMULI WITH OPA
IN PLACE. BP 89/31. ANESTHESIA GAVE MEDICATION TO RAISE BP. 1200 BP
128/75. 1206 PT REACTIVE. OPA REMOVED. 1210 ICE AND ELEVATION TO R
KNEE. NO C/O'S.

## 2021-03-13 NOTE — EKG
[facility]
                                    [address] [facility]
                                  [place], [place]  [address]
_________________________________________________________________________________________
                                                                 Signed   
 
 
Normal sinus rhythm
Normal ECG
When compared with ECG of 12-JUN-2020 10:15,
Nonspecific T wave abnormality, improved in Anterior leads
Confirmed by MARK MEYER DO ([id number]) on 3/13/2021 7:03:36 PM
 
 
 
 
 
 
 
 
 
 
 
 
 
 
 
 
 
 
 
 
 
 
 
 
 
 
 
 
 
 
 
 
 
 
 
 
 
 
 
 
    Electronically Signed By: MARK MEYER DO  03/13/21 1903
_________________________________________________________________________________________
PATIENT NAME:     DARVIN QUIJANO                       
MEDICAL RECORD #: [id number]                     Electrocardiogram             
          ACCT #: [id number]  
DATE OF BIRTH:   [birth year]                                       
PHYSICIAN:   MARK MEYER DO                     REPORT #: [id number]
REPORT IS CONFIDENTIAL AND NOT TO BE RELEASED WITHOUT AUTHORIZATION

## 2021-06-05 NOTE — NUR
PT REPORT BACK PAIN THAT IS RELATED TO HER CURRENT FALLS AT HOME. DR. WALKER
CALL FOR PAIN RELIEVER. NEW TELEPHONE ORDERS RECEIVED. VERIFIED WITH READ BACK
METHOD.

## 2021-06-05 NOTE — NUR
ADMISSION ASSESSMENT COMPLETE. SCHEDULED MEDS ADMINISTERED PER EMAR. LIDOCAINE
PATCH PLACED ON LOWER LEFT BACK. PT UP TO BSC WITH 2PA TO VOID 450 ML CLOUDY
URINE. STAFF ASSIST WITH JENNI CARE. BACK TO BED, NOEL WELL. PT RELUCTANT TO GET
OUT OF BED TO VOID INITIALLY, ASSURED PT STAFF WOULD ASSIST. PT AGREEABLE.
SCATTERED BRUISES FROM PREVIOUS FALLS AT HOME NOTED ON BUE. BLE EDEMA. PT
DENIES QUESTIONS OR CONCERNS. PT ORIENTED TO ROOM AND NURSE CALL LIGHT. NO
FURTHER NEEDS. CALL LIGHT IN REACH.

## 2021-06-05 NOTE — NUR
PT ARRIVES TO FLOOR VIA STRETCHER. PULLED OVER TO BED WITH ASSISTANCE. PT
REQUESTS TO USE BEDPAN, UNSUCCESSFUL AT VOIDING AT THIS TIME. ATTENDS PLACED
FOR DRIBBLING INCONTINENCE. IV FLUSHED, WNL. EDUCATION PROVIDED REGARDING POC
FOR THIS SHIFT, ORIENTATION TO ROOM, CALL LIGHT USE. PT STATES UNDERSTANDING.
WHITEBOARD UPDATED. CALL LIGHT IN REACH. ROOM IN VIEW OF RN STATION.

## 2021-06-06 NOTE — NUR
CALL LIGHT ANSWERED. PT UP TO BSC TO VOID 300 ML CLOUDY URINE WITH 2PA AND
FWW. STAFF ASSIST WITH JENNI CARE. BACK TO BED, NOEL WELL. VS AND I&O COMPLETE.
CALL LIGHT IN REACH.

## 2021-06-06 NOTE — NUR
Patient resting in bed, respirations even and non labored. Patient has no
needs at this time. Visitor at bedside. Personal supplies and call light
within reach.

## 2021-06-06 NOTE — NUR
PT UTILIZES CALL LIGHT, REQUESTS TO USE THE BATHROOM. PT UP TO BSC WITH SBA
AND FWW. TOLERATED WELL. ASSESSMENT COMPLETE. PT STATES THAT PAIN IS BEGINNING
TO INCREASE, WOULD LIKE A PAIN PILL WHEN AVAILABLE. PT DENIES SOB OR NAUSEA.
IV SITE FLUSHED, WNL. EDEMA NOTED TO BLE, 2+. PT WOULD LIKE PILLOW UNDER FEET
REMOVED AT THIS TIME. PT DENIES FURTHER NEEDS AT THIS TIEM. CALL LIGHT IN
REACH.

## 2021-06-06 NOTE — NUR
PT UP TO BSC WITH 1PA AND FWW TO VOID. JENNI CARE DONE BY STAFF. BACK TO BED,
NOEL WELL. VS AND I&O COMPLETE. DENIES FURTHER NEEDS. CALL LIGHT IN REACH.

## 2021-06-06 NOTE — EKG
[facility]
                                    [address] [facility]
                                  [place]  [address]
_________________________________________________________________________________________
                                                                 Signed   
 
 
Normal sinus rhythm
Normal ECG
When compared with ECG of 11-MAR-2021 14:32,
Inverted T waves have replaced nonspecific T wave abnormality in Anterior leads
Confirmed by JOEY WALKER MD ([id number]) on 6/6/2021 8:11:39 PM
 
 
 
 
 
 
 
 
 
 
 
 
 
 
 
 
 
 
 
 
 
 
 
 
 
 
 
 
 
 
 
 
 
 
 
 
 
 
 
 
    Electronically Signed By: JOEY WALKER MD  06/06/21 2011
_________________________________________________________________________________________
PATIENT NAME:     DARVIN QUIJANO                       
MEDICAL RECORD #: [id number]                     Electrocardiogram             
          ACCT #: [id number]  
DATE OF BIRTH:   [birth year]                                       
PHYSICIAN:   JOEY WALKER MD           REPORT #: [id number]
REPORT IS CONFIDENTIAL AND NOT TO BE RELEASED WITHOUT AUTHORIZATION

## 2021-06-06 NOTE — NUR
CALL LIGHT ON. SBA TO BSC AND BACK TO BED. pt REQUIRED VERBAL CUEING TO
TRANSFER SAFELY. PROVIDED FRESH WATER. pt RESTING IN BED. CALL LIGHT WITHIN
REACH.

## 2021-06-06 NOTE — NUR
Assisted patient to bedside commode; 1PA with walker, tolerated fair. Patient
reports bilat knee pain and lower back pain. Lidocaine patch intact to lower
back. Patient assisted back to bed. Personal supplies and call light within
reach.

## 2021-06-06 NOTE — NUR
Patient assisted from chair to bed; standy assist with walker. Patient reports
her back pain has improved. Patient tolerated 100% of dinner. No current
needs. Personal supplies and call light within reach.

## 2021-06-06 NOTE — NUR
CALL LIGHT ANSWERED. PT UP TO BSC WITH 2PA AND FWW. GAIT STEADY. JENNI CARE
DONE BY STAFF. BACK TO BED, NOEL WELL. NO FURTHER NEEDS. CALL LIGHT IN REACH.

## 2021-06-06 NOTE — NUR
PT UP TO BSC WITH 1PA AND FWW. GAIT STEADY. STAFF ASSIST WITH JENNI CARE. BACK
TO BED, NOEL WELL. SCHEDULED MEDS ADMINISTERED. NO FURTHER NEEDS AT THIS TIME.

## 2021-06-06 NOTE — NUR
Patient sitting up in chair, respirations even and non labored. Patient has no
distress at this time. Iv patent at this time. Patient provided with warm
blanket. No needs at this time. Call light wihin reach.

## 2021-06-06 NOTE — NUR
CALL LIGHT ANSWERED. SBA TO BEDSIDE COMMODE. PATIENT VOIDED 400ML. PATIENT IS
BACK IN BED. NO OTHER NEEDS AT THIS TIME. CALL LIGHT WITHIN REACH.

## 2021-06-07 NOTE — NUR
PT ASSESSMENT COMPLETE. PT RESTING IN BED AWAKE. LAUGHS ABOUT EARLIER
FORGETTING WHERE SHE WAS AND CALLING HER HUSBANDS CELL PHONE TO COME AND HELP
HER. PT AGREES THAT SHE HAS HAD A BUSY DAY. PT REPORTS PAIN 6/10 TO BACK AND
LEGS. DENIES SOB OR NAUSEA. PRN PAIN MEDCIATION ADMINISTERED, SEE EMAR. IV
SITE FLUSHED, PATENT, WNL. BLE WITH 2+ EDEMA. PT DECLINES TO ELEVATE LEGS AT
THIS TIME. REPORTS THAT THEY ARE TENDER TO TOUCH, REPORTS NUMBNESS TO ALL
EXTREMITIES. PT REQUESTS TO USE THE BEDPAN, PROVIDED AND ASSISTED BACK OFF. PT
TOLERATED ROLLING WITH MINIMAL ASSISTANCE. PT PROVIDED WITH GASTROGRAFIN 15 ML
AS ORDERED. PT STATES UNDERSTANDING, AND IS DRINKING IT APPROPRIATELY. PT ASKS
WHEN I WILL BRING HER PAIN PILL. REMINDED PT THAT SHE ALREADY TOOK THIS SHE
STATES SHE REMEMBERS. DENIES FURTHER NEEDS AT THIS TIME. CALL LIGHT IN REACH.

## 2021-06-07 NOTE — NUR
PATIENT IN BED WATCHING TV. VITALS AND I&O'S CHARTED. FRESH WATER GIVEN. CALL
LIGHT IN REACH. NO FURTHER NEEDS AT THIS TIME.

## 2021-06-07 NOTE — NUR
SPOKE WITH PATIENT IN ROOM.  PATIENT IN BED, VERY VERY TEARFUL THROUGH
ASSESSMENT.  SHE LIVES WITH SPOUSE STEFAN, HE IS 86 AND HIS MOBILITY IS
CHALLENGED ALSO.  SHE NO LONGER DRIVES,  DOES DRIVING.  THEY HAVE A
RAMP INTO HOME, BUT IT HAS A LIP THAT SHE KNOW CANNOT STEP OVER.  SHE HAS NOT
LEFT THE HOME FOR MANY MONTHS.  THEY HAVE FAMILY HERE, DAUGHTER IS CURRENTLY
VERY ILL WITH CANCER.  SHE WOULD LIKE HER GRANDDAUGHTER PLACED AS CONTACT
INSTEAD.  ELENA [phone].  PATIENT DENIES PROBLEMS PAYING FOR
MEDS/FOOD/UTILITIES.  SHE STATES THEY WOULD HAVE NO MONEY THOUGH FOR HIRING
CARE AT HOME OR PAYING FOR ASSISTED LIVING.  SHE STATES HER FAMILY WORKS AND
HAVE "NO TIME".  THEY HAVE A WALK-IN SHOWER WITH SHOWER DOORS THAT DO NOT OPEN
VERY WIDE.  SHE STATES SHE CANNOT GET INTO IT ALONE.  HER  HELPS HER
IN, THEN HE HAS TO PUT CHAIR IN.  SHE CAN ONLY REACH HER HEAD TO TORSO, HE HAS
TO DO LEGS/BACK/PRIVATES FOR HER.  SHE STATES HE GETS ANGRY WITH HER DAILY
NOW, "HE IS SO FRUSTRATED WITH ME".  SHE STATES THEY TOOK DOOR OFF BATHROOM
BECAUSE SHE FELL IN THERE AND BLOCKED THE DOOR A FEW TIMES. SHE STATES SHE
CANNOT DO HOUSEWORK, LAUNDRY, COOKING.  SHE STATES HER  SHUFFLES AND
CANNOT TO THINGS LIKE HE USED TO, STATES "HE TRIES, BUT GETS MAD".  SHE STATES
SHE HOPES SHE CAN GO TO "A NURSING HOME" TO GET SOME HELP.  WE DISCUSSED
OBSERVATION VS INPATIENT.  DISCUSSED COVERAGE WITH MEDICARE AND HER
SUPPLEMENT.  DISCUSSED THAT THEY COULD POSSIBLY USE A DHS EVAL TO SEE IF THEY
QUALIFY FOR HELP WITH SOME CAREGIVERS.  SHE IS OPEN TO THAT.  SHE WAS THINKING
SHE COULD MOVE TO ASSISTED LIVING, BUT DID NOT KNOW MEDICARE DOES NOT PAY FOR
THIS.  PATIENT STATES SHE HAS NOT GONE TO THE DR'S OFFICE BECAUSE "I CANNOT
EVEN GET OUT OF THE HOUSE".  DISCUSSED SHE SHOULD STILL CALL THEM AND LET THEM
KNOW SO THEY CAN FIGURE OUT A WAY TO HELP HER.  PATIENT IS VERY TEARFUL
THROUGH THE WHOLE VISIT.

## 2021-06-07 NOTE — NUR
SPINAL TAP PERFORMED, WELL TOLERATED BY PT. SHE IS RESTING FLAT NOW VERBLAIZES
UNDERSTANDING. DENIES NEEDS AT THIS TIME CALL LIGHT IN HAND

## 2021-06-07 NOTE — NUR
AM CARE DONE AND GAVE PATIENT SUPPLIES FOR ORAL CARE. PATIENT STATES SHE CANT
WALK TO GET UP IN CHAIR. NOTHING ELSE NEEDED AT THIS TIME

## 2021-06-07 NOTE — NUR
Patient reports her pain is 6/10 in her lower back and knee areas. One tab
Norco 7.5/325mg po admin for this pain. OT working with patient at this time.

## 2021-06-07 NOTE — NUR
ANSWERED CALL LIGHT. SBA TO BEDSIDE COMMODE. NO OTHER NEEDS. PATIENT IS ABCK
IN BED. CALL LIGHT WITHIN REACH.

## 2021-06-07 NOTE — NUR
Assisted patient to bedside commode and back to bed. Patient reports she still
has urinary urgency/frequency. Back and knee pain reported to be improving
with pain medication. No needs at this time. Personal supplies and call light
within reach.

## 2021-06-07 NOTE — NUR
PT CALLED, NEEDED BATHROOM.  PT ON PHONE TO  THOUGHT THIS RN WAS AT HER
HOME, DIDN'T KNOW WHERE SHE WAS.  SAID SHE HAD "WOKE UP" AND DIDN'T KNOW.
 WAS CONCERNED PER PATIENT.  USED BEDPAN, COMPLAINT OF SL HEADACHE,
FACE FLUSHED, ROOM WARM @ 73.

## 2021-06-07 NOTE — NUR
PT ASSESSMENT COMPLETE. WRITER TO ROOM FOR IV PUMP ALARMING. PT STATES THAT
SHE HAS SLEPT WELL. DENIES PAIN, NAUSEA, OR SOB AT THIS TIME. BLE WITH 2+
EDEMA, TENDER TO TOUCH. PT DECLINES TO ELEVATE BLE AT THIS TIME. PT NOW
STATING THAT SHE HAS CHRONIC NUMBNESS IN ALL EXTREMTIES, PT PREVIOUSLY ENDORES
NUMBNESS TO BUE ONLY. IV FLUSHED, POSITIONAL BUT PATENT. PT DENIES FURTHER
NEEDS. CALL LIGHT IN REACH.

## 2021-06-07 NOTE — NUR
CALL LIGHT ANSWERED. ASSISTED PATIENT USE THE BED PAN. WARM BLANKET PROVIDED.
NO OTHER NEEDS AT THIS TIME.

## 2021-06-08 NOTE — NUR
PATIENT BACK FROM MRI.
UP TO BSC AND THEN BACK TO BED, 1PA FWW. RN IN ROOM. VITALS AND I&O'S CHARTED.
CALL LIGHT IN REACH. NO FURTHER NEEDS AT THIS TIME.

## 2021-06-08 NOTE — NUR
PT DOWN FOR MRI TECH CALLS STATES PT CAN'T BE STILL AND IS NOW REFUSING MRI.
REPORTED TO DR WALKER HE ADVISES ATIVAN. RN TO MRI ROOM PT CHANGES HER MIND
STATES SHE DOESN'T WANT MEDS SHE WILL GET THROUGH IT AND BE STILL.  IS
IN THE ROOM NOTIFIED IT WILL BE 30-45 MINUTES MORE AT LEAST

## 2021-06-08 NOTE — NUR
CALL LIGHT ANSWERED. ASSISTED TO USE BED PAN. CALL LIGHT IN REACH, pt
VERBALIZES INSTRUCTION TO CALL WHEN FINISHED.

## 2021-06-08 NOTE — NUR
PT TAKEN TO IMAGING,  STEFAN SITTING IN RM. HE REMEMBERS ME FROM
PREVIOUS ADMISSION-SAME RM HE STATED. HAD PLEASANT VISIT, WILL CONTINUE
TO FOLLOW

## 2021-06-08 NOTE — NUR
WRITER PROVIDED CLINICAL UPDATE TO NORBERTO AT [facility]. FAXED COPY
OF COVID STATUS PER REQUEST.

## 2021-06-08 NOTE — NUR
EVENING ASSESSMENT COMPLETE. SCHEDULED MEDS ADMINISTERED PER EMAR. PRN FOR
PAIN ADMINISTERED FOR REPORTS OF 7/10 GENERALIZED PAIN BUT MAINLY IN HANDS AND
FEET AT THIS TIME.
ASSISTED PT TO USE BED PAN AS PT FELT TOO WEAK AND PAINFUL TO USE BSC.
JENNI CARE DONE BY STAFF. REPOSITIONED IN BED WITH 2PA. PT DENIES QUESTIONS OR
CONCERNS AT THIS TIME. CALL LIGHT IN REACH.

## 2021-06-08 NOTE — NUR
PT UP TO THE BSC THEN RETURNS TO RESTING IN BED. PT IS TEARFUL REGARDING DX SO
FAR, STATES SHE IS WORRIED LUNG NODULES COULD BE CANCER, WORRIES HOW TO RETURN
FROM [place]. TIME SPENT WITH THIS PT ALLOWING HER TO VOICE HER CONCERNS
COMFORT ATTEMPTED.

## 2021-06-08 NOTE — NUR
PT [place] [facility] DOSE OF GASTROGRAFIN GIVEN IN 20 OZ OF APPLE JUICE.
PT DENIES FURTHER NEEDS AT THIS TIME. CALL LIGHT IN REACH. ROOM IN VIEW OF RN
STATION WITH CURTAIN OPEN.

## 2021-06-08 NOTE — NUR
PT STOOD WITH FWW AND USED BSC. OT WORKING WITH PT ON ORAL CARE AND JENNI CARE.
CALL LIGHT WITHIN REACH. NO FURTHER NEEDS AT THIS TIME

## 2021-06-08 NOTE — NUR
PT ASSESSMENT COMPLETE. PT UTLIZES CALL LIGHT, REQUESTS TO USE BSC. PT HAD XL
LOOSE BM. PT STATES THAT PAIN IS WELL CONTROLLED. DENIES SOB OR NAUSEA.
ASSESSMENT UNCHANGED FROM PREVIOUS. PT DENIES FURTHER NEEDS AT THIS TIME. CALL
LIGHT IN REACH.

## 2021-06-08 NOTE — NUR
Spoke with Ai, she states she is planning transfer to [facility] when becomes
available.  Has questions concerning transport and informed she will go by
ground ambulance and Medicare will pay. She wants to know if they will cover
the cost of return to [place] by Ambulance and updated it will depend on her
condition.  She states her last trip was covered.

## 2021-06-08 NOTE — NUR
REPORT RECEIVED FROM DAY SHIFT RN. PT LYING IN BED ALERT AND ORIENTED. DENIES
NEEDS AT THIS TIME. WHITE BOARD UPDATED. CALL LIGHT IN REACH.

## 2021-06-08 NOTE — NUR
PT EATS BREAKFAST AFTER RETURNING FROM TESTING WELL TOLERATED. PT RESTING IN
BED AT THIS TIME DENIES NEEDS OF ANYTHING.

## 2021-06-08 NOTE — NUR
pt up to bsc states she feels weaker than before. reminded her of all the time
on mri table as well as long hard day, perhaps she is just tired. returns to
resting in bed evening meal ordered.

## 2021-06-09 NOTE — NUR
PATIENT AWAKE IN BED, VITALS DONE BY LEONOR PUCKETT, I&OS CHARTED. WASHCLOTH PROVIDED
FOR FACE AND HANDS, FRESH ICE WATER PROVIDED. NO OTHER NEEDS AT THIS TIME

## 2021-06-09 NOTE — NUR
CARE MEETING COMPLETED WITH STAFF RN. PATIENT STILL AWAITING TRANSFER BED TO
[facility] AT THIS TIME. [facility] GIVEN CLINICAL UPDATE BY CORBY GALVEZ. NO CHANGES IN
DISCHARGE PLAN AT THIS TIME. WILL CONTINUE TO FOLLOW UP WITH PATIENT DURING
HER VISIT.

## 2021-06-09 NOTE — NUR
1PA FWW PIVOT TO THE BSC, ASSISTED BY RN WITH BOOST IN BED, FRESH ICEWATER
PROVIDED, NO FURTHER NEEDS

## 2021-06-09 NOTE — NUR
Spoke with Jyoti, [facility] transfer center. Updated on patient status. Informed
there are currently no available beds and unknown at this time when one will
be available.

## 2021-06-09 NOTE — NUR
Up to bsc, voided, tolerated well, slight redness between buttocks, attends in
place, 1PA/FWW. denies sob on return, coop with vitals and assessments. On
room air. bruised arms healing. sl patent. c/o 5/10 generalized hands and legs
pain. medicated with norco 1 tabs. tolerating liquids well, no emesis, warm
blnaket and fresh fluids given. hob elevated to her comfort

## 2021-06-09 NOTE — NUR
PT IN ROOM WITH . PT DENIES NEEDS AT THIS TIME. PT CURRENTLY IN BED
WITH CALL LIGHT IN REACH. BED RAILS IN UP POSITION.

## 2021-06-09 NOTE — NUR
PT SPENT HER DAY SITTING IN ROOM WITH  IN ROOM. PT CONTINUES TO HAVE
URINARY FREQUENCY. PT CURRENTLY IN ROOM EATING A HAMBURGER. DENIES NEEDS AT
THIS TIME.

## 2021-06-09 NOTE — NUR
IN TO ASSIST PT TO THE BSC, 1PA FWW PIVOT, THEN BACK TO BED, VITALS TAKEN, RN
IN ROOM FOR PM MEDS, ICE WATER TOPPED OFF, NO FURTHER NEEDS AT THIS TIME

## 2021-06-09 NOTE — NUR
CALL LIGHT ANSWERED. PT UP TO BSC WITH 1PA AND FWW TO VOID 300 ML. STAFF
ASSIST WITH JENNI CARE. BACK TO BED, NOEL FAIR. PT WEAK AND PAINFUL WITH
MOVEMENT. DENIES FURTHER NEEDS. BED ALARM FOR SAFETY.

## 2021-06-09 NOTE — NUR
Patient voices concern about weekly medication, Methotrexate, for RA. Dr. Espinosa notified. Telephone order Dr. Espinosa/TAMIA Gomez RN, for methotrexate 20 mg
PO X1 at [facility].

## 2021-06-10 NOTE — NUR
Pt sitting up in bed resting safely w/ call light in reach. Pt declined offer
to sit up in recliner and have light turned on. Pt denies need for pain
medication. Pt given fresh ice water, no other needs at this time.

## 2021-06-10 NOTE — NUR
Lying in bed, resting with eyes closed. Respirations even and unlabored.
Allowed to rest at this time. Call light in reach, bed rails up X2.

## 2021-06-10 NOTE — NUR
Pt used call light to request to use the BSC. 1PA w/FWW to BSC. Pt voided and
transfered back to bed. Pt resting safely w/ call light in reach and BLE
elevated. Pt c/o pain 6/10 in BLE, PRN pain meds administered per order. No
other needs at this time.

## 2021-06-10 NOTE — NUR
IN TO GET VITALS WITH RN, PT UP TO TE BSC, 1PA FWW, BACK TO BED WITH RN, NO
FURTHER NEEDS AT THIS TIME

## 2021-06-10 NOTE — NUR
Report from ROSEMARY Wong RN. Patient resting in bed, eyes closed, respirations
even and unlabored. Allowed to rest at this time. Call light in reach, bed
rails up X2.

## 2021-06-10 NOTE — NUR
in bed, awakes easily, no further c/o pain, was medicated earlier by another
RN. On room air, sl patent, edema to LE, declines to elevated. cooperative.
call light and fluids at bedside

## 2021-06-10 NOTE — NUR
Pt rested in bed for entire shift. Pt declined several offers to sit up in
recliner or shower. VSS on RA. Awaiting [facility] bed. PRN pain meds last given at
1445.

## 2021-06-10 NOTE — NUR
PATIENTS VITALS TAKEN AND RECORDED. INTAKE AND OUPUT RECORDED. PATIENTS
EVENING MEDICATIONS GIVEN PER ORDER. PATIENT RATES PAIN AT A 5/10 IN HER RIGHT
LEG. PATIENT GIVEN PRN PAIN MEDICATION PER ORDER. PATIENT DENIES ANY FURTHER
NEEDS. CALL LIGHT IN REACH.

## 2021-06-10 NOTE — NUR
Pt has slept, was medicated with per pain x1, effective, c/o abd pain.
discomofrt and was given Zofran, no further c/o. effective, tolerating diet
well. On room air. lungs clear dim at bases, no c/o sob. Up to BSC several
times, tolerated well, no SOB, SL patent. edema to bilat LE, decllines to
elevated them. still waiting for [facility] bed. [facility] transfer center staff Vaishali
called this am to verify pts status and to let us know she is still on the
list for a bed.

## 2021-06-10 NOTE — NUR
It was my pleasure to visit with Ai this morning.  Ai states that she is
mostly happy with her care here in the hospital.  She did have some concerns
about the cleanliness of her room, and her being able to wash her face for the
day.  She did explain that she did not want to shower however.  We immediately
took care of her requests, and ES was advised that the patient would like her
room cleaned.  She did not specifically complain about the restroom, but
stated that she would like the floors cleaned.  On inspection there was no
visible dirt, mud, debry, etc. on the floor.  Garbage cans were baically empty
of debris, and the bathroom appeared clean with no oders.  We did attend to
Ai's request for freshening herself, and her room.  No other concerns were
verbalized at this time, and patient admits that her pain has been well
controlled and her patient care neeeds have been met.

## 2021-06-10 NOTE — NUR
c/o hands and legs pain 5/10, medicated with Norco 1 tab. on room air,
moist non productive cough present. Up to bsc, voided, back to bed. slightly
unsteady and slow painful gait  present. cooperative

## 2021-06-10 NOTE — NUR
ASSISTED PATIENT TO BSC. PATIENT REFUSED SHOWER. I CLEANED UP PATIENTS ROOM.
SHE STATED SHE DID NOT NEED ANYTHING AT THIS TIME. CALL LIGHT IN REACH.

## 2021-06-11 NOTE — NUR
Spoke with LEONOR Olea at [facility] regarding patient updates. All questions
answered.

## 2021-06-11 NOTE — NUR
1PA TO BSC- BEDBATH COMPLETE, ORAL AND JENNI CARE AS WELL. LOTION ON BODY,
PATIENT HAS A QUARTER SIZED CALLUS/ BLISTER ON THE BOTTOM OF HER RIGHT
FOOT, ON THE PAD BELOW LARGE TOE. VERY PAINFUL. CALL LIGHT IN REACH, FRESH ICE
WATER PROVIDED

## 2021-06-11 NOTE — NUR
resting, no distress, fluids and call light at bedside
[facility] call center called asking about pt, there are still
no bed available. Report given.

## 2021-06-11 NOTE — NUR
Patient resting in bed, alert and oriented x4. Patient has no distress.
Personal supplies and call light within reach.

## 2021-06-11 NOTE — NUR
PT ASLEEP, DID NOT DISTURB. PT IS TO TX OUT TO [facility] AS SOON AS BED OPENS. WILL
FOLLOW AS NEEDED

## 2021-06-11 NOTE — NUR
SHIFT REPORT RECEIVED FROM ALON GALVEZ. PT RESTING IN BED. NO NEEDS AT THIS TIME.
CALL LIGHT IN REACH.

## 2021-06-11 NOTE — NUR
PT HAS SLEPT, MEDICATED WITH NORCO X2, PER GENRAL PAIN WITH GOOD PAIN RELIEF.
UP TO BSC EARLIER ON SHIFT, THIS AM PT STARTED HAVING MORE PAIN R LEG AND
INCREASED GAIT AND COORDINATION PROBLEMS WHEN TRYING TO GET OFF FROM BED TO
BSC. BEDPAN USED, VOIDING QS. EDEMA LE, R LEG MORE PROMINENT THAN LEFT. GOOD
CMS. TOLERATING FLUIDS WELL ON ROOM AIT, USES CALL LIGHT AND REPOSITINS SELF.
SL PATENT

## 2021-09-07 NOTE — NUR
PT ADMITTED TO ROOM 111 FROM ED.  DEPENDENT ON STAFF TO SLIDE OVER FROM
STRETCHER TO BED.  RA, RESPONDED TO QUESTIONS.  COMPLAINED OF COLD, WARM
BLANKET PROVIDED.  INDWELLING SAMANIEGO.  COCCYX ALLEYN APPLIED TO BOTTON, LEFT
SIDE BUTTOCK WITH SMALL OPEN WOUND.  PICTURES IN CHART.  PT RESIDES AT
[facility].   AT HOME.

## 2021-09-08 NOTE — NUR
DR. WALKER NOTIFIED OF BLOOD PRESSURE BELOW PARAMETERS. NEW TELEPHONE ORDERS
RECEIVED. VERIFIED WITH READ BACK METHOD.

## 2021-09-08 NOTE — NUR
PT HAS BEEN ALERT OVER THIS SHIFT, SHE REPORTS PAIN AT BOTTOCKS AND LEGS,
TYLENOL HAS BEEN GIVEN TWICE. SHE IS BEDBOUND AT BASE LINE, SHE HAS BEEN ABLE
TO FEED HERSELF. SHE CALLS FREQUENT FOR ASSISTANCE WITH PHONE, STAFF
REPOSITIONS WHEN IN ROOM. SHE NOW HAS MIDLINE RIGHT ARM, OK TO DRAW BLOOD FROM
LABS, FLUSH REGULARLY TO MAINTAIN. SHE HAS BEEN EMOTIONAL OVER SHIFT DUE TO
INSURANCE ISSUES, [facility]  HAS ASSURED STAFF TO LET PATIENT KNOW SHE
WILL BE ABLE TO RETURN TO [facility] AND [facility] HAS BEEN IN CONTACT WITH PATIENTS
FAMILY AS WELL.

## 2021-09-08 NOTE — NUR
NOTIFIED DR WALKER OF CRITICAL LAB VALUE, MAG 0.9.  ORDERS RECEIVED FOR 2 GM
MAG SULFATE Q 2 HOURS FOR TOTAL OF 3 DOSES.  DR WALKER AWARE OF THE HGB/HCT
LAB.

## 2021-09-08 NOTE — NUR
PT REPORTED SHE NEEDS HELP EATING. SHE HAS BEEN ABLE TO FEED SELF FOR LUNCH,
RN IN ROOM TO ASSESS, SHE WAS ABLE TO BRING SPOON TO MOUTH WITHOUT ANY ISSUES.
FROM FAMILY REPORT SHE IS ABLE TO FEED SELF AT BASELINE AND REQUIRES
ASSISTANCE FOR ALL OTHER ADLS.

## 2021-09-08 NOTE — NUR
Patient vitals, I&Os are complete. Call light is in reach. She will be
expecting a phone call from her  tomorrow.

## 2021-09-08 NOTE — NUR
In to speak with pt, she states she can't speak with me.  I need to call her
spouse.  Rn overheard this and states she spoke with daughter and per daughter
spouse has dementia.  Will call daughter.

## 2021-09-08 NOTE — NUR
VS AND I&O COMPLETE. SCHEDULED MEDS ADMINISTERED PER EMAR. PT SOMNOLENT.
AWAKENS BRIEFLY WHEN MOVED. REPOSITIONED WITH PILLOWS IN BED. SAMANIEGO PATENT
WITH QS YELLOW URINE. SEDIMENT NOTED. RESPIRATIONS EVEN. SpO2 % ON RA.
OCCASIONAL DRY COUGH NOTED. CALL LIGHT IN REACH. BED ALARM FOR SAFETY.

## 2021-09-08 NOTE — NUR
PT SITTING UP IN BED ALERT TALKING ON PHONE, CNA IN ROOM TO TAKE V/S AND I/O
CASE MANAGEMENT NOTIFIED OF PATIENT DISTRESS ABOUT INSURANCE CONCERNS.

## 2021-09-08 NOTE — NUR
PT RESTING IN BED EYES CLOSED RR REGULAR. PT APPEARS TO BE SLEEPING. NO
DISTRESS NOTED. 95% OXYGEN SATURATION AT THIS TIME PER TELEMETRY PULSE
OXIMETRY.

## 2021-09-08 NOTE — NUR
ADMISSION ASSESSMENT COMPLETE. SCHEDULED MEDS ADMINISTERED PER EMAR. PRN FOR
PAIN ADMINISTERED FOR C/O SORE THROAT. HOB ELEVATED. NO SWALLOWING ISSUES
NOTED. IV BOLUS INFUSING WNL. PT ALERT AND ORIENTED X 3. DENIES NAUSEA OR SOB.
SAMANIEGO PATENT WITH YELLOW URINE. SEDIMENT NOTED. PRESSURE SORE NOTED ON
COCCYX (PICS IN CHART), ALLEVYN PLACED. JENNI AREA RED AND EXCORIATED. BARRIER
CREAM APPLIED. HEEL PROTECTORS APPLIED. ASSISTED PT WITH ORAL CARE. WARM
BLANKET GIVEN. PT DENIES QUESTIONS OR CONCERNS. CALL LIGHT IN REACH. BED ALARM
FOR SAFETY.

## 2021-09-08 NOTE — NUR
CALL LIGHT ANSWERED. IN TO ASSIST PT WITH SIPS OF WATER AND REPOSITION WITH
PILLOWS. PT AGREES SHE IS COMFORTABLE. NO FURTHER NEEDS.

## 2021-09-08 NOTE — NUR
IV BOLUS COMPLETE. MAINTENANCE FLUIDS INFUSING WNL. PT REPOSITIONED IN BED.
DENIES SOB. RA. SpO2 95%. TELE #3. AFIB. HR 70'S. BED ALARM FOR SAFETY. CALL
LIGHT IN REACH.

## 2021-09-08 NOTE — NUR
Shift report recieved from LEONOR Robin, pt resting in bed safely w/ call light in
reach, no needs at this time.

## 2021-09-08 NOTE — NUR
PT REQUESTED MELATONIN FOR SLEEP, PROVIDER CALLED AND TORB RECEIVED FOR 3MG
MELATONIN QHS. PT GIVEN MELATONIN, RESTING IN BED SAFELY W/ CALL LIGHT IN
REACH, NO OTHER NEEDS AT THIS TIME.

## 2021-09-08 NOTE — NUR
MIDLINE INSERTION NOTE:
 
ASKED BY DR. WALKER TO EVALUATE PATIENT FOR POTENTIAL MIDLINE PLACEMENT.
PATIENT HAS A HISTORY OF POOR IV ACCESS, AND CURRENTLY ONLY HAS A 22 G IN LEFT
WRIST. PT ALSO REPORTS THAT SHE HAS HAD PICC LINES IN THE PAST. PATIENT IS
ADMITTED FOR DEHYDRATION/DIARRHEA/COVID+. PATIENT ABLE TO GIVE VERBAL CONSENT
FOR MIDLINE PLACEMENT AND ALL QUESTIONS ANSWERED AS BEST AS POSSIBLE PRIOR TO
PROCEDURE STARTING.
 
PATIENT'S RIGHT ARM WAS EVALUATED FIRST, AND THE BASILIC AND BRACHIAL VEINS
WERE IDENTIFIED EASILY. PATIENT'S CEPHALIC VEIN WAS NOT EASILY IDENTIFIED.
PATIENT'S BASILIC VEIN PER SITE RITE 8 U/S ESTIMATED THAT A 4FR CATHETER WOULD
TAKE UP 28% OF THE VEIN DIAMETER, THEREFORE THIS WAS SELECTED AS THE BEST
CHOICE. PATIENT'S SKIN WAS STERILY PREPPED AND DRAPED. PATIENT WAS GIVE
LIDOCAINE 1% SUB-Q PRIOR TO STARTING PROCEDURE. BASILIC VEIN WAS EASILY
ACCESSED USING THE POWERGLIDE PRO MIDLINE, 18G, AND THE CATHETER WAS THREADED
INTO THE VEIN EASILY. DARK, BRISK, NON PULSATILE BLOOD WAS EASILY RETURNED
FROM MIDLINE. LINE FLUSHES AND DRAWS BLOOD BACK EASILY. STERILE DRESSING WAS
PLACED OVER MIDLINE, INCLUDING BIO PATCH AND SECUREMENT DEVICE. PATIENT WAS
GIVEN INSTRUCTIONS AND EDUCATION REGARDING HER MIDLINE. EDUCATION MATERIAL WAS
LEFT INSIDE PATIENT'S CHART. REPORT GIVEN TO LEONOR FERNANDO WHO IS CARING FOR THIS
PATIENT.

## 2021-09-08 NOTE — NUR
Patient is wondering where her phone is but mentioned that her  is
getting it fixed earlier. Vitals, I&Os done. Call light in reach.

## 2021-09-08 NOTE — NUR
IN TO GET VITALS, SAMANIEGO EMPTIED, CATH CARE COMPLETED, WATER REFILLED, NO ICE,
PT REQUESTING DANIELLA, RN NOTIFIED, NO FURTHER NEEDS AT THIS TIME

## 2021-09-08 NOTE — NUR
called and left a message with Negar Sousa to check if and eval has been
started for Ai and if her spouse has contacted them to get medicade.

## 2021-09-08 NOTE — NUR
Called emergency contact and reached Marianne, granddaughter.  She states her
mom is ill and had treatment for cancer and is trying to not have increased
stress.  Discussed with Marianne needs for Ai.  Ai currently living at
[facility].  Insurance is running out.  Pt. spouse has attempted
to contact medicade, but stated to granddaughter they have not called him
back.  Notified she can make a referral and gave her the number for [facility] and
Negar Sousa's name.  She states her grandfather is from a different
generation and will not release any financial info and Ai does not have
this information either.  Asked her to call [facility] and speak with Negar and get
the process started.  Also informed she may need her mom to step and get
financials from dad, if he won't work with Marianne.  She also states she has
difficulty getting information from hospitals. Discussed she can sign a
medical rep form with her grandmother and then information has to be released.
She will follow up tomorrow and contact me if she wants to sign form and will
contact [employer].

## 2021-09-09 NOTE — NUR
Pt awake for majority of shift, calling frequently for various things, from
taking gown off to pulling off prince stat lock on inner thigh. Prince w/
sufficient urine output and pt w/ sufficient fluid intake. IV fluids infusing
per provider order. Pt bedbound at baseline, 1-2PA for repositioning and
incontenence of stool.

## 2021-09-09 NOTE — NUR
PT CALLED FOR ASSISTANCE W/ GOWN. PT HAD TAKEN GOWN OFF, ASSISTED PT TO PUT
GOWN BACK ON, PT RESTING SAFELY IN BED W/ CALL LIGHT IN REACH, DENIES ANY
OTHER NEEDS AT THIS TIME.

## 2021-09-09 NOTE — NUR
PT CALLING, PT STATES THAT NEEDS TO CHAT, LET PT KNOW THAT IT WILL BE A LITTLE
BIT BEFORE WE CAN BE BACK IN, INFORMED PT ABOUT MINAMIZING PPE USAGE AND
LIMITING STAFF EXPOSURE TO PTs PERCAUSONS, PT SEEMS TO BE AGREEABLE,
THIS CNA REMINDED PT TO STILL CALL IF OTHER IMPORTANT NEEDS AROSE, NO FURTHER
NEEDS AT THIS TIME

## 2021-09-09 NOTE — NUR
PT CALL LIGHT ON, PT ASKING FOR HELP WITH 'THE STATION' FROM DOORWAY, THIS CNA
TRYS TO GET PT TO CLEARIFY WHAT SHE MEANS, PT DOES NOT, IN TO ASSIST PT, PT
HOLDING IV TUBING UP, TOLD PT ABOUT WHY SHE HAS IS IT HAD PT LET GO/DROP IT TO
THE SIDE, NOTED SAMANIEGO TUBING WAS TAUGHT ON THE SIDE RAIL, FREED THE TUBING,
COVERED PT UP WITH JUST THE SHEET PER PT REQUEST, NO FURTHER NEEDS AT THIS
TIME

## 2021-09-09 NOTE — NUR
Was able to reach Negar Sousa at [facility] after several calls.  She has not
spoken with pts , but did receive a call from Janna at [facility] today.
Informed I have spoken with Granddaughter yesterday and then the grandson
called today.  Gave both of them her number and asked them to call as per
their reports grandfather has dementia is overwhelmed.  Updated pt is
returning to [facility] today.

## 2021-09-09 NOTE — NUR
Notified by Dr. Romeo in [facility], pt may dc after he sees her today.  Will update
Heather Hu as they are under executive order and need auth for [facility]
for pt to return.
 
Texted Heather update when meeting was completed.

## 2021-09-09 NOTE — NUR
PT LEFT WITH AMBULANCE TRANSPORT WITH ALL BELONGINGS. MIDLINE REMOVED BY THIS
RN WITH CATH INTACT. PT. DENIES PAIN AND VITALS STABLE.

## 2021-09-09 NOTE — NUR
Pt called stating she "needed help removing the patch". Upon entering the room
pt had removed the stat lock from thigh. Stat lock replaced and pt educated on
leaving in place.

## 2021-09-09 NOTE — NUR
Notified by Dr. Vee, pt will return to [facility] today.  Called and left a message
for Heather.  Orders completed and faxed.  Spoke with charge RN for appropriate
transport for this pt. Pt will require a [facility] van, updated [facility] will not
transport Covid +.  Pt is unable to walk and has dementia and would be unsafe
in [facility] transport.  Will schedule with EMS for transport when I hear from [facility]
they have accepted the orders.

## 2021-09-09 NOTE — NUR
PT. USED CALL LIGHT FOR ASSISTANCE WITH REPOSITIONING. THIS NURSE AND CNA
RESPONDED. PT.'S LOWER BODY WAS OVER THE SIDE OF THE BED WITH FEET ON THE
GROUND. PT. ASSISTED BACK INTO THE BED AND REPOSITIONED. ATTENDS WET AND
CHANGED. DISCUSSED SAFETY AND USING CALL LIGHT. BED ALARM ON AND CURTAIN OPEN.

## 2021-09-09 NOTE — NUR
PATIENT AGREES TO ALLOW NURSING STAFF TO GIVE UPDATES TO RAGHU ARGUELLO AT THIS TIME. HE CALLED THIS AM AND WANTED TO SEE IF HE COULD VISIT
PT AND GET UPDATES. HE VERBALIZED HE WILL CALL BACK LATER THIS AM.

## 2021-09-09 NOTE — NUR
REPORT RECEIVED FROM NIGHT RN AND PT. CARE RESUMED. PT. IS ALERT AND ORIENTED
TO ALL BUT TIME. SHE IS ANXIOUS AND WANTS TO SPEAK WITH . ASSISTED IN
CALLING. PT. HAD A LARGE INCONTINENT STOOL. CLEANED AND CHANGED. PT. WAS FOUND
SCRATCHING HER PERIAREA AND AREA HAS SEVERAL STRATCHES THAT ARE BLEEDING.
DISCUSSED NOT SCRATCHING WITH PT. COCYX ALLEVYN REPLACED. WOUND UNDERNEATH IS
DRY WITH AN OPEN PRESSURE INJURY. RT. HIM DRESSING PEELING AND REPLACED WITH
AN ALEVYN. WOUND UNDERNEATH IS A REDDENED/ BRUISED AREA WITH SKIN INTACT. PT.
HAD A SECOND LARGE LOOSE BM AND WAS CLEANED AGAIN. SAMANIEGO REMOVED WITH CATH
INTACT. PT. REPORTS CHRONIC ARTHRITIS ACHING THAT IS TOLERABLE. ASSISTED WITH
BREAKFAST. PT. HAS A NONPRODUCTIVE COUGH AND IS ON ROOM AIR.  WHEN CNA REMOVED
TELE STICKERS, THE AREA BEGAN TO BLEED UNDERNEATH THE SKIN. PT. HAS SCATTERED
BRUISING ON ARMS. ASSISTED WITH REPOSITIONING. PT. LEFT RESTING WITH CALL
LIGHT IN REACH.

## 2021-09-09 NOTE — NUR
REturned call to granddaughter, Marianne.  She states she was able to speak
with her grandfather today and he was willing to give financial info.  She has
spoken with Negar Sousa prior to my call and have set a time to met with her
and grandfather.  Per Marianne, grandfather has stated over and over he has
been speaking with Medicaid, actually he has been speaking with the [facility]
for [facility] and he was unable to assist him with medicaid.  Marianne
denies further needs and will follow up with KYRIE and Janna from [facility]. She is
aware Ai return to [facility] today.

## 2021-09-09 NOTE — NUR
CALL LIGHT ON, PT STATES "I JUST NEED HELP IN HERE!", IN RM TO FIND PT WITH
GOWN COMPLETELY OFF, ATTENDS HALF OFF, PT RECENTLY HAD A BM, PT CLEANED UP,
NEW ATTENDS IN PLACE, PT PLACED BACK INTO GOWN, BOOSTED IN BED NO FURTHER
NEEDS AT THIS TIME

## 2021-09-09 NOTE — NUR
CALL LIGHT ON, PT NEEDS HELP, DONNING PPE TO GO IN RM, PT CALLS AGAIN, ONCE IN
RM LET PT KNOW IT TAKES A MINUTE TO GET READY TO COME INTO YOUR RM, PT IS
UNDERSTANDING, ASSISTED PT WITH BLANKETS AND GOWN, PT WAS FEELING TANGLED, PT
HAVING ANXITIY, FEELING OF BEING A BOTHER, AND PT STATES 'SOMETIMES I FEEL
LIKE I SHOULD HAVE BEEN LEFT IN MY ROOM ([facility]) TO DIE' CHATTED WITH PT
AND TRYING TO ENCOURAGE PT THAT WE WANT HER TO GET BETTER AND REASURE PT THAT
SHE IS NOT A BOTHER, PT STATES SHE IS UNABLE TO GET BACK TO SLEEP AGAIN,
TRYING TO REASSURE PT, NO FURTHER NEEDS

## 2021-09-09 NOTE — NUR
Received text from LEONOR Romero reviewing orders.  I called and scheduled EMS
transport for 2pm.  NOtified EMS pt needs to be taken through the side door
into [facility].

## 2021-09-21 NOTE — NUR
MOVED PT FROM ED BED TO MED SURG BED. PT OPENED EYES AND GRIMACED BUT QUICKLY
BACK TO SLEEP. DID NOT WAKE FOR DTR. FAMILY IN ROOM, ANSWERED ALL QUESTIONS.
PT HAS MULT BRUISES ON BODY FROM FALLS AT [facility]. PCA SET UP AND VERIFIED WITH LEONOR GALEANO.

## 2021-09-21 NOTE — NUR
Spoke with Granddaughter Kilo.  Pts daughter and spouse in the room
Pt's spouse has gone home.  Answered comfort care questions and
encouraged family to decide on a mortuary and call and give them their
name.  Also discussed check list of things to do when someone passes.
Questions answered and discussed pts breathing as she is [place]-
ing with 7-12 sec pauses.  They would like to keep the 02 in place and
let them know this is ok, if this is their wish.  Kitchen brought food
cart with sweet rolls and coffee to the room.  Family deny further
questions. They would like to use [facility] and charge nurse
notified.

## 2021-09-21 NOTE — NUR
REPOSTIONED PT AGAIN AND CHANGED DEPENDS FOR FIRST TIME. JENNI AREA IS VERY
EXCORIATED AND BRUISING CONTINUES OVER ALL OF HER BODY. APPLIED BARRIER CREAM
AND TRIED TO NOT ROLL HER VERY FAR. BREATHING IS SHALLOW AND IRREGULAR.
APPEARS TO BE COMFORTABLE THOUGH. DID ORAL CARE AGAIN AND WAS ABLE TO SEE THAT
IT IS HER TONGUE THE BLOOD IS COMING FROM.

## 2021-09-21 NOTE — NUR
@2020 GRANDDAUGHTER CALLED THIS RN INTO ROOM, SAYING "I THINK SHE HAS PASSED".
NOTED NO HEARTBEAT x 1 MIN, NO RESP.  DR WALKER, SUPERVISER, [facility] CARE
NOTIFIED.  DR WALKER TO FLOOR @ 2045.  FAMILY CONTINUES TO BE IN ROOM.  WAITING
FOR PT . DONOR LINE HAS BEEN CONTACTED BY STAFF RN.

## 2021-09-21 NOTE — NUR
Called into [facility] at  because PT had passed. PT's family was here when I
arrived. I met with the nurse and then talked with PT's granddaughter who was
doing most of the communicating. I went into PT's room and offered my
condolences and offered to pray with them. PT's  asked me to call the
 home. I called the [facility] at  and let the family know when
to expect them. They opted to say their good-byes before the mortuary arrived.
[facility] arrived at .

## 2021-09-21 NOTE — NUR
LOIS RN NOTIFIED THIS RN THAT PATIENT HAD PASSED. PATIENT ASSESED AND
PATIENT HAS NO SIGNS OF LIFE, NO HR OBSERVED OR RR. FAMILY IS AT THE BEDSIDE.
PLACED CALL TO HOUSE SUPERVISOR. MARIA ISABEL RN PLACED CALL TO MD. PASTORAL CARE
NOTIFIED. PETER RN IS PLACING CALL TO DONOR LINE. FAMILY DENIES ANY NEEDS.

## 2021-09-21 NOTE — NUR
RECEIVED REPORT FROM DAY SHIFT RN. PATIENT IS RESTING IN BED WITH EYES CLSOED,
RR 15. FAMILY DENIES ANY NEEDS. CALL LIGHT IN REACH.
